# Patient Record
Sex: FEMALE | Race: BLACK OR AFRICAN AMERICAN | NOT HISPANIC OR LATINO | Employment: STUDENT | URBAN - METROPOLITAN AREA
[De-identification: names, ages, dates, MRNs, and addresses within clinical notes are randomized per-mention and may not be internally consistent; named-entity substitution may affect disease eponyms.]

---

## 2017-08-21 RX ORDER — NORGESTIMATE AND ETHINYL ESTRADIOL 7DAYSX3 28
1 KIT ORAL DAILY
Qty: 30 TABLET | Refills: 9 | Status: CANCELLED | OUTPATIENT
Start: 2017-08-21 | End: 2018-08-21

## 2018-01-23 ENCOUNTER — LAB VISIT (OUTPATIENT)
Dept: LAB | Facility: HOSPITAL | Age: 23
End: 2018-01-23
Attending: FAMILY MEDICINE
Payer: COMMERCIAL

## 2018-01-23 ENCOUNTER — OFFICE VISIT (OUTPATIENT)
Dept: FAMILY MEDICINE | Facility: CLINIC | Age: 23
End: 2018-01-23
Payer: COMMERCIAL

## 2018-01-23 VITALS
HEART RATE: 87 BPM | HEIGHT: 68 IN | DIASTOLIC BLOOD PRESSURE: 84 MMHG | OXYGEN SATURATION: 98 % | TEMPERATURE: 99 F | WEIGHT: 250.69 LBS | BODY MASS INDEX: 37.99 KG/M2 | RESPIRATION RATE: 18 BRPM | SYSTOLIC BLOOD PRESSURE: 122 MMHG

## 2018-01-23 DIAGNOSIS — Z23 NEED FOR HPV VACCINATION: ICD-10-CM

## 2018-01-23 DIAGNOSIS — Z00.00 ANNUAL PHYSICAL EXAM: Primary | ICD-10-CM

## 2018-01-23 DIAGNOSIS — E66.9 OBESITY (BMI 30-39.9): ICD-10-CM

## 2018-01-23 DIAGNOSIS — Z00.00 ANNUAL PHYSICAL EXAM: ICD-10-CM

## 2018-01-23 DIAGNOSIS — Z30.41 ENCOUNTER FOR SURVEILLANCE OF CONTRACEPTIVE PILLS: ICD-10-CM

## 2018-01-23 LAB
ALBUMIN SERPL BCP-MCNC: 3.6 G/DL
ALP SERPL-CCNC: 73 U/L
ALT SERPL W/O P-5'-P-CCNC: 10 U/L
ANION GAP SERPL CALC-SCNC: 6 MMOL/L
AST SERPL-CCNC: 15 U/L
BASOPHILS # BLD AUTO: 0.04 K/UL
BASOPHILS NFR BLD: 0.8 %
BILIRUB SERPL-MCNC: 0.3 MG/DL
BUN SERPL-MCNC: 11 MG/DL
CALCIUM SERPL-MCNC: 9.4 MG/DL
CHLORIDE SERPL-SCNC: 104 MMOL/L
CHOLEST SERPL-MCNC: 190 MG/DL
CHOLEST/HDLC SERPL: 4.2 {RATIO}
CO2 SERPL-SCNC: 28 MMOL/L
CREAT SERPL-MCNC: 1 MG/DL
DIFFERENTIAL METHOD: ABNORMAL
EOSINOPHIL # BLD AUTO: 0.2 K/UL
EOSINOPHIL NFR BLD: 4.5 %
ERYTHROCYTE [DISTWIDTH] IN BLOOD BY AUTOMATED COUNT: 14.5 %
EST. GFR  (AFRICAN AMERICAN): >60 ML/MIN/1.73 M^2
EST. GFR  (NON AFRICAN AMERICAN): >60 ML/MIN/1.73 M^2
GLUCOSE SERPL-MCNC: 90 MG/DL
HCG INTACT+B SERPL-ACNC: <1.2 MIU/ML
HCT VFR BLD AUTO: 37.7 %
HDLC SERPL-MCNC: 45 MG/DL
HDLC SERPL: 23.7 %
HGB BLD-MCNC: 11.9 G/DL
IMM GRANULOCYTES # BLD AUTO: 0.01 K/UL
IMM GRANULOCYTES NFR BLD AUTO: 0.2 %
LDLC SERPL CALC-MCNC: 131 MG/DL
LYMPHOCYTES # BLD AUTO: 1.8 K/UL
LYMPHOCYTES NFR BLD: 37 %
MCH RBC QN AUTO: 27.4 PG
MCHC RBC AUTO-ENTMCNC: 31.6 G/DL
MCV RBC AUTO: 87 FL
MONOCYTES # BLD AUTO: 0.7 K/UL
MONOCYTES NFR BLD: 14.9 %
NEUTROPHILS # BLD AUTO: 2.1 K/UL
NEUTROPHILS NFR BLD: 42.6 %
NONHDLC SERPL-MCNC: 145 MG/DL
NRBC BLD-RTO: 0 /100 WBC
PLATELET # BLD AUTO: 292 K/UL
PMV BLD AUTO: 10.9 FL
POTASSIUM SERPL-SCNC: 4.1 MMOL/L
PROT SERPL-MCNC: 7.5 G/DL
RBC # BLD AUTO: 4.35 M/UL
SODIUM SERPL-SCNC: 138 MMOL/L
TRIGL SERPL-MCNC: 70 MG/DL
TSH SERPL DL<=0.005 MIU/L-ACNC: 1.44 UIU/ML
WBC # BLD AUTO: 4.84 K/UL

## 2018-01-23 PROCEDURE — 80053 COMPREHEN METABOLIC PANEL: CPT

## 2018-01-23 PROCEDURE — 36415 COLL VENOUS BLD VENIPUNCTURE: CPT | Mod: PO

## 2018-01-23 PROCEDURE — 99999 PR PBB SHADOW E&M-EST. PATIENT-LVL III: CPT | Mod: PBBFAC,,, | Performed by: FAMILY MEDICINE

## 2018-01-23 PROCEDURE — 80061 LIPID PANEL: CPT

## 2018-01-23 PROCEDURE — 90651 9VHPV VACCINE 2/3 DOSE IM: CPT | Mod: S$GLB,,, | Performed by: FAMILY MEDICINE

## 2018-01-23 PROCEDURE — 99395 PREV VISIT EST AGE 18-39: CPT | Mod: 25,S$GLB,, | Performed by: FAMILY MEDICINE

## 2018-01-23 PROCEDURE — 88175 CYTOPATH C/V AUTO FLUID REDO: CPT

## 2018-01-23 PROCEDURE — 85025 COMPLETE CBC W/AUTO DIFF WBC: CPT

## 2018-01-23 PROCEDURE — 84443 ASSAY THYROID STIM HORMONE: CPT

## 2018-01-23 PROCEDURE — 90471 IMMUNIZATION ADMIN: CPT | Mod: S$GLB,,, | Performed by: FAMILY MEDICINE

## 2018-01-23 PROCEDURE — 87491 CHLMYD TRACH DNA AMP PROBE: CPT

## 2018-01-23 PROCEDURE — 84702 CHORIONIC GONADOTROPIN TEST: CPT

## 2018-01-23 RX ORDER — NORGESTIMATE AND ETHINYL ESTRADIOL 7DAYSX3 28
1 KIT ORAL DAILY
Qty: 28 TABLET | Refills: 11 | Status: SHIPPED | OUTPATIENT
Start: 2018-01-23 | End: 2018-08-01 | Stop reason: SDUPTHER

## 2018-01-24 LAB
C TRACH DNA SPEC QL NAA+PROBE: NOT DETECTED
N GONORRHOEA DNA SPEC QL NAA+PROBE: NOT DETECTED

## 2018-01-31 ENCOUNTER — PATIENT OUTREACH (OUTPATIENT)
Dept: ADMINISTRATIVE | Facility: HOSPITAL | Age: 23
End: 2018-01-31

## 2018-07-18 ENCOUNTER — OFFICE VISIT (OUTPATIENT)
Dept: FAMILY MEDICINE | Facility: CLINIC | Age: 23
End: 2018-07-18
Payer: COMMERCIAL

## 2018-07-18 VITALS
HEIGHT: 68 IN | BODY MASS INDEX: 36.32 KG/M2 | OXYGEN SATURATION: 99 % | SYSTOLIC BLOOD PRESSURE: 124 MMHG | HEART RATE: 69 BPM | TEMPERATURE: 96 F | WEIGHT: 239.63 LBS | DIASTOLIC BLOOD PRESSURE: 86 MMHG

## 2018-07-18 DIAGNOSIS — F41.8 SITUATIONAL ANXIETY: Primary | ICD-10-CM

## 2018-07-18 PROCEDURE — 99213 OFFICE O/P EST LOW 20 MIN: CPT | Mod: S$GLB,,, | Performed by: FAMILY MEDICINE

## 2018-07-18 PROCEDURE — 99999 PR PBB SHADOW E&M-EST. PATIENT-LVL III: CPT | Mod: PBBFAC,,, | Performed by: FAMILY MEDICINE

## 2018-07-18 NOTE — PROGRESS NOTES
Ingrid Burger    Chief Complaint   Patient presents with    Anxiety     pt c/o strange feelings       History of Present Illness:   Ms. Burger comes in today for anxiety follow-up.  She complains of having chest tightness and felt tensed and excited while at work last Thursday; she states she was getting paint for order as she works at [employer].  She states she went to [facility] for evaluation and states she was told she likely has anxiety but nothing was given to her; she states she was told to see me.      She states she feels okay today and has not felt anxious since that time.  However, she reports having other episodes of anxiety in the past but not on a consistent basis.  She states she walks at work; she states worked is good.  She denies insomnia, depression, homicidal or suicidal thoughts; shortness of breath, cough, wheezing; chest pain, palpitations, leg swelling; abdominal pain, nausea, vomiting, diarrhea, constipation; unusual urinary symptoms; fever, chills, fatigue, appetite change; back pain; headaches.     She states she rarely drinks alcohol and denies tobacco or illicit drug use.     She states her mother has anxiety.     She is fasting and has not taken her medication today.            Current Outpatient Prescriptions   Medication Sig    norgestimate-ethinyl estradiol (TRINESSA, 28,) 0.18/0.215/0.25 mg-35 mcg (28) tablet Take 1 tablet by mouth once daily. (not currently taking)       Review of Systems   Constitutional: Negative for activity change, appetite change, chills, fatigue and fever.   Respiratory: Positive for chest tightness. Negative for cough, shortness of breath and wheezing.    Cardiovascular: Negative for chest pain, palpitations and leg swelling.   Gastrointestinal: Negative for abdominal pain, constipation, diarrhea, nausea and vomiting.   Genitourinary: Negative for difficulty urinating.   Musculoskeletal: Negative for back pain.   Neurological: Negative  for headaches.   Psychiatric/Behavioral: Negative for dysphoric mood, sleep disturbance and suicidal ideas. The patient is nervous/anxious.         Negative for homicidal ideas.       Objective:  Physical Exam   Constitutional: She is oriented to person, place, and time. She appears well-developed and well-nourished. No distress.   Pleasant.   Neck: Normal range of motion. Neck supple. No thyromegaly present.   Cardiovascular: Normal rate, regular rhythm, normal heart sounds and intact distal pulses.    No murmur heard.  Pulmonary/Chest: Effort normal and breath sounds normal. No respiratory distress. She has no wheezes.   Abdominal: Soft. Bowel sounds are normal. She exhibits no distension and no mass. There is no tenderness. There is no guarding.   Musculoskeletal: Normal range of motion. She exhibits no edema or tenderness.   She is ambulatory without problems.   Lymphadenopathy:     She has no cervical adenopathy.   Neurological: She is alert and oriented to person, place, and time.   Skin: She is not diaphoretic.   Psychiatric: She has a normal mood and affect. Her behavior is normal. Judgment and thought content normal.   Vitals reviewed.      ASSESSMENT:  1. Situational anxiety        PLAN:  Ingrid was seen today for anxiety.    Diagnoses and all orders for this visit:    Situational anxiety  -     Ambulatory referral to Social Work       I discussed with patient based on current history of symptoms I doubt she needs medication therapy for anxiety but counseling for helpful coping skills may be good for her. She verbalized understanding.  Continue current medications, follow low sodium, low cholesterol, low carb diet, daily walks.  Follow-up if symptoms worsen or fail to improve.   Total visit time 15 minutes with 100% of time spent in counseling and discussion regarding above issue.

## 2018-08-01 RX ORDER — NORGESTIMATE AND ETHINYL ESTRADIOL 7DAYSX3 28
1 KIT ORAL DAILY
Qty: 28 TABLET | Refills: 4 | Status: SHIPPED | OUTPATIENT
Start: 2018-08-01 | End: 2019-06-14 | Stop reason: SDUPTHER

## 2018-10-05 ENCOUNTER — OFFICE VISIT (OUTPATIENT)
Dept: FAMILY MEDICINE | Facility: CLINIC | Age: 23
End: 2018-10-05
Payer: COMMERCIAL

## 2018-10-05 VITALS
BODY MASS INDEX: 34.11 KG/M2 | SYSTOLIC BLOOD PRESSURE: 120 MMHG | RESPIRATION RATE: 16 BRPM | DIASTOLIC BLOOD PRESSURE: 68 MMHG | WEIGHT: 225.06 LBS | HEART RATE: 92 BPM | HEIGHT: 68 IN | TEMPERATURE: 98 F | OXYGEN SATURATION: 97 %

## 2018-10-05 DIAGNOSIS — Z23 NEED FOR INFLUENZA VACCINATION: ICD-10-CM

## 2018-10-05 DIAGNOSIS — R41.840 ATTENTION OR CONCENTRATION DEFICIT: ICD-10-CM

## 2018-10-05 PROCEDURE — 99999 PR PBB SHADOW E&M-EST. PATIENT-LVL IV: CPT | Mod: PBBFAC,,, | Performed by: FAMILY MEDICINE

## 2018-10-05 PROCEDURE — 90471 IMMUNIZATION ADMIN: CPT | Mod: S$GLB,,, | Performed by: FAMILY MEDICINE

## 2018-10-05 PROCEDURE — 99213 OFFICE O/P EST LOW 20 MIN: CPT | Mod: 25,S$GLB,, | Performed by: FAMILY MEDICINE

## 2018-10-05 PROCEDURE — 90686 IIV4 VACC NO PRSV 0.5 ML IM: CPT | Mod: S$GLB,,, | Performed by: FAMILY MEDICINE

## 2018-10-05 NOTE — PROGRESS NOTES
Ingrid Burger    Chief Complaint   Patient presents with    Focus issue       History of Present Illness:   Ms. Burger comes in today with complaint of having issues with focus and concentration.  She states she can recall having symptoms since 9th grade (2014) and continues to notice now as she states she has difficulty with focusing and concentrating in class and with completing homework.  She states she attends [employer] as a freshman with 12 hours this semester majoring in criminal justice; she states she has not yet received great for this semester.  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  She states when she is talking with her mother she sometimes goes blank with focus deficit during the conversation.    She denies associated anxiety, depression, homicidal or suicidal thoughts, insomnia, shortness of breath, cough, wheezing, chest pain, palpitations, leg swelling, fever, chills, fatigue, appetite change, abdominal pain, nausea, vomiting, diarrhea, constipation, unusual urinary symptoms, back pain, headaches.  She denies tobacco, alcohol, or illicit drug use.            Current Outpatient Medications   Medication Sig    norgestimate-ethinyl estradiol (TRINESSA, 28,) 0.18/0.215/0.25 mg-35 mcg (28) tablet Take 1 tablet by mouth once daily.       Review of Systems   Constitutional: Negative for activity change, appetite change, chills, fatigue and fever.   Respiratory: Negative for cough, shortness of breath and wheezing.    Cardiovascular: Negative for chest pain, palpitations and leg swelling.   Gastrointestinal: Negative for abdominal pain, constipation, diarrhea, nausea and vomiting.   Genitourinary: Negative for difficulty urinating.   Musculoskeletal: Negative for back pain.   Neurological: Negative for headaches.   Psychiatric/Behavioral: Positive for decreased concentration. Negative for dysphoric mood, sleep  disturbance and suicidal ideas. The patient is not nervous/anxious.         Negative for homicidal ideas.       Objective:  Physical Exam   Constitutional: She is oriented to person, place, and time. She appears well-developed and well-nourished. No distress.   Pleasant.   Neck: Normal range of motion. Neck supple. No thyromegaly present.   Cardiovascular: Normal rate, regular rhythm, normal heart sounds and intact distal pulses.   No murmur heard.  Pulmonary/Chest: Effort normal and breath sounds normal. No respiratory distress. She has no wheezes.   Abdominal: Soft. Bowel sounds are normal. She exhibits no distension and no mass. There is no tenderness. There is no guarding.   Musculoskeletal: Normal range of motion. She exhibits no edema or tenderness.   She is ambulatory without problems.   Lymphadenopathy:     She has no cervical adenopathy.   Neurological: She is alert and oriented to person, place, and time.   Skin: She is not diaphoretic.   Psychiatric: She has a normal mood and affect. Her behavior is normal. Judgment and thought content normal.   Vitals reviewed.      ASSESSMENT:  1. Attention or concentration deficit    2. Need for influenza vaccination        PLAN:  Ingrid was seen today for focus issue.    Diagnoses and all orders for this visit:    Attention or concentration deficit  -     Ambulatory referral to Psychiatry    Need for influenza vaccination  -     Influenza - Quadrivalent (3 years & older) (PF)       Continue current medications, follow low sodium, low cholesterol, low carb diet, daily walks.  Total visit time 15 minutes with 100% of time spent in counseling and discussion regarding above.

## 2019-06-14 RX ORDER — NORGESTIMATE AND ETHINYL ESTRADIOL 7DAYSX3 28
1 KIT ORAL DAILY
Qty: 28 TABLET | Refills: 1 | Status: SHIPPED | OUTPATIENT
Start: 2019-06-14 | End: 2020-09-26

## 2019-06-17 ENCOUNTER — OFFICE VISIT (OUTPATIENT)
Dept: FAMILY MEDICINE | Facility: CLINIC | Age: 24
End: 2019-06-17
Payer: COMMERCIAL

## 2019-06-17 VITALS
TEMPERATURE: 98 F | WEIGHT: 245.56 LBS | SYSTOLIC BLOOD PRESSURE: 118 MMHG | BODY MASS INDEX: 37.21 KG/M2 | DIASTOLIC BLOOD PRESSURE: 82 MMHG | HEIGHT: 68 IN | OXYGEN SATURATION: 97 %

## 2019-06-17 DIAGNOSIS — F90.9 ATTENTION DEFICIT HYPERACTIVITY DISORDER (ADHD), UNSPECIFIED ADHD TYPE: Primary | ICD-10-CM

## 2019-06-17 PROCEDURE — 99999 PR PBB SHADOW E&M-EST. PATIENT-LVL III: ICD-10-PCS | Mod: PBBFAC,,, | Performed by: FAMILY MEDICINE

## 2019-06-17 PROCEDURE — 99213 PR OFFICE/OUTPT VISIT, EST, LEVL III, 20-29 MIN: ICD-10-PCS | Mod: S$GLB,,, | Performed by: FAMILY MEDICINE

## 2019-06-17 PROCEDURE — 99999 PR PBB SHADOW E&M-EST. PATIENT-LVL III: CPT | Mod: PBBFAC,,, | Performed by: FAMILY MEDICINE

## 2019-06-17 PROCEDURE — 99213 OFFICE O/P EST LOW 20 MIN: CPT | Mod: S$GLB,,, | Performed by: FAMILY MEDICINE

## 2019-06-17 NOTE — PROGRESS NOTES
Ingrid Burger    Chief Complaint   Patient presents with    ER Follow up       History of Present Illness:   Ms. Burger comes in today for ER follow-up.    She states she is followed by Psychiatry Dr. Mariah Tristan at [facility] and states on June 5, 2019 she was prescribed Adderall XR advised Vyvance was not helpful for ADHD.  She states she has follow-up visit in August 2019.    She states she was evaluated on multiple visits at [facility] ER (June 13th x2, June 14th) for chest pain, throat tightness, shortness of breath, nausea, heartburn, lightheadedness, musculoskeletal pain and told her symptoms were possibly due to reaction from Adderall XR.  On the last ER visit, she states she was given hydroxyzine 25 mg 4 times daily prn and states she feels better now.  She states she was advised to see PCP in 2 days for follow-up.    She states she stopped taking Adderall XR on June 11, 2019.    She denies tobacco or illicit drug use but occasionally drinks alcohol.    Otherwise, she states she feels okay today and reports having no acute symptoms today.        Current Outpatient Medications   Medication Sig    norgestimate-ethinyl estradiol (TRINESSA, 28,) 0.18/0.215/0.25 mg-35 mcg (28) tablet Take 1 tablet by mouth once daily.     Review of Systems   Constitutional: Negative for appetite change, chills, fatigue and fever.   Respiratory: Positive for chest tightness and shortness of breath. Negative for cough and wheezing.         See history of present illness.     Cardiovascular: Positive for chest pain. Negative for palpitations and leg swelling.        See history of present illness.     Gastrointestinal: Positive for nausea. Negative for abdominal pain, constipation, diarrhea and vomiting.        See history of present illness.     Genitourinary: Negative for difficulty urinating.   Musculoskeletal: Negative for back pain.   Neurological: Positive for light-headedness. Negative for headaches.    Psychiatric/Behavioral: Negative for dysphoric mood, sleep disturbance and suicidal ideas. The patient is nervous/anxious.         Negative for homicidal ideas. See history of present illness.       Objective:  Physical Exam   Constitutional: She is oriented to person, place, and time. She appears well-developed and well-nourished. No distress.   Pleasant.   Neck: Normal range of motion. Neck supple. No thyromegaly present.   Cardiovascular: Normal rate, regular rhythm, normal heart sounds and intact distal pulses.   No murmur heard.  Pulmonary/Chest: Effort normal and breath sounds normal. No respiratory distress. She has no wheezes.   Abdominal: Soft. Bowel sounds are normal. She exhibits no distension and no mass. There is no tenderness. There is no guarding.   Musculoskeletal: Normal range of motion. She exhibits no edema or tenderness.   She is ambulatory without problems.   Lymphadenopathy:     She has no cervical adenopathy.   Neurological: She is alert and oriented to person, place, and time.   Skin: She is not diaphoretic.   Psychiatric: She has a normal mood and affect. Her behavior is normal. Judgment and thought content normal.   Vitals reviewed.      ASSESSMENT:  1. Attention deficit hyperactivity disorder (ADHD), unspecified ADHD type        PLAN:  Ingrid was seen today for er follow up.    Diagnoses and all orders for this visit:    Attention deficit hyperactivity disorder (ADHD), unspecified ADHD type       Continue current medications, follow low sodium, low cholesterol, low carb diet, daily walks.  Keep follow up with specialists (including encouraged patient to call psychologist today).  Total visit time 15 minutes with >50% of time spent in counseling and discussion regarding above.  Follow up in about 3 months (around 9/18/2019) for physical.

## 2019-09-11 ENCOUNTER — PATIENT OUTREACH (OUTPATIENT)
Dept: ADMINISTRATIVE | Facility: HOSPITAL | Age: 24
End: 2019-09-11

## 2019-09-11 NOTE — PROGRESS NOTES
PreVisit Chart Audit Perfomed         Marleny VARELA LPN Care Coordinator  Care Coordination Department  [facility]  [phone]

## 2020-02-26 NOTE — TELEPHONE ENCOUNTER
Due for physical.  
LV 07/07/2016  
OB Provider reported that the vagina was inspected and no foreign body was found/Laps, needles and instrument count was correct

## 2020-03-09 ENCOUNTER — HOSPITAL ENCOUNTER (EMERGENCY)
Facility: HOSPITAL | Age: 25
Discharge: HOME | End: 2020-03-09
Attending: EMERGENCY MEDICINE
Payer: COMMERCIAL

## 2020-03-09 VITALS
SYSTOLIC BLOOD PRESSURE: 134 MMHG | BODY MASS INDEX: 39.76 KG/M2 | HEIGHT: 68 IN | HEART RATE: 81 BPM | RESPIRATION RATE: 16 BRPM | DIASTOLIC BLOOD PRESSURE: 91 MMHG | OXYGEN SATURATION: 97 % | TEMPERATURE: 99 F | WEIGHT: 262.38 LBS

## 2020-03-09 DIAGNOSIS — M54.2 NECK PAIN: ICD-10-CM
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
## 2020-03-09 PROCEDURE — 99284 EMERGENCY DEPT VISIT MOD MDM: CPT | Mod: 25

## 2020-03-09 RX ORDER — CYCLOBENZAPRINE HCL 10 MG
10 TABLET ORAL 3 TIMES DAILY PRN
Qty: 15 TABLET | Refills: 0 | Status: SHIPPED | OUTPATIENT
Start: 2020-03-09 | End: 2020-03-14

## 2020-03-09 RX ORDER — NAPROXEN 500 MG/1
500 TABLET ORAL 2 TIMES DAILY WITH MEALS
Qty: 60 TABLET | Refills: 0 | Status: SHIPPED | OUTPATIENT
Start: 2020-03-09 | End: 2020-07-23
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
## 2020-07-22 ENCOUNTER — NURSE TRIAGE (OUTPATIENT)
Dept: ADMINISTRATIVE | Facility: CLINIC | Age: 25
End: 2020-07-22

## 2020-07-22 NOTE — TELEPHONE ENCOUNTER
Patient and mom is calling. Ingrid is c/o severe Vertigo and dizziness. She needs assistance to walk.  Reason for Disposition   SEVERE dizziness (e.g., unable to stand, requires support to walk, feels like passing out now)    Additional Information   Negative: Shock suspected (e.g., cold/pale/clammy skin, too weak to stand, low BP, rapid pulse)   Negative: Difficult to awaken or acting confused (e.g., disoriented, slurred speech)   Negative: Fainted, and still feels dizzy afterwards   Negative: Severe difficulty breathing (e.g., struggling for each breath, speaks in single words)   Negative: Overdose (accidental or intentional) of medications   Negative: New neurologic deficit that is present now: * Weakness of the face, arm, or leg on one side of the body * Numbness of the face, arm, or leg on one side of the body * Loss of speech or garbled speech   Negative: Heart beating < 50 beats per minute OR > 140 beats per minute   Negative: Sounds like a life-threatening emergency to the triager   Negative: Chest pain   Negative: Rectal bleeding, bloody stool, or tarry-black stool   Negative: Vomiting is the main symptom   Negative: Diarrhea is the main symptom   Negative: Headache is the main symptom   Negative: Heat exhaustion suspected (i.e., dehydration from heat exposure)   Negative: Patient states that he/she is having an anxiety/panic attack    Protocols used: DIZZINESS-A-OH

## 2020-07-23 ENCOUNTER — OFFICE VISIT (OUTPATIENT)
Dept: FAMILY MEDICINE | Facility: CLINIC | Age: 25
End: 2020-07-23
Payer: COMMERCIAL

## 2020-07-23 VITALS
DIASTOLIC BLOOD PRESSURE: 86 MMHG | BODY MASS INDEX: 39.05 KG/M2 | HEIGHT: 68 IN | HEART RATE: 94 BPM | WEIGHT: 257.63 LBS | SYSTOLIC BLOOD PRESSURE: 122 MMHG | OXYGEN SATURATION: 96 % | RESPIRATION RATE: 16 BRPM | TEMPERATURE: 99 F

## 2020-07-23 DIAGNOSIS — H93.12 TINNITUS OF LEFT EAR: ICD-10-CM

## 2020-07-23 DIAGNOSIS — Z20.822 EXPOSURE TO COVID-19 VIRUS: ICD-10-CM

## 2020-07-23 DIAGNOSIS — R11.0 NAUSEA: ICD-10-CM

## 2020-07-23 DIAGNOSIS — R42 VERTIGO: ICD-10-CM

## 2020-07-23 PROCEDURE — 99214 OFFICE O/P EST MOD 30 MIN: CPT | Mod: 25,S$GLB,, | Performed by: FAMILY MEDICINE

## 2020-07-23 PROCEDURE — 99999 PR PBB SHADOW E&M-EST. PATIENT-LVL IV: CPT | Mod: PBBFAC,,, | Performed by: FAMILY MEDICINE

## 2020-07-23 PROCEDURE — 99999 PR PBB SHADOW E&M-EST. PATIENT-LVL IV: ICD-10-PCS | Mod: PBBFAC,,, | Performed by: FAMILY MEDICINE

## 2020-07-23 PROCEDURE — U0003 INFECTIOUS AGENT DETECTION BY NUCLEIC ACID (DNA OR RNA); SEVERE ACUTE RESPIRATORY SYNDROME CORONAVIRUS 2 (SARS-COV-2) (CORONAVIRUS DISEASE [COVID-19]), AMPLIFIED PROBE TECHNIQUE, MAKING USE OF HIGH THROUGHPUT TECHNOLOGIES AS DESCRIBED BY CMS-2020-01-R: HCPCS

## 2020-07-23 PROCEDURE — 99214 PR OFFICE/OUTPT VISIT, EST, LEVL IV, 30-39 MIN: ICD-10-PCS | Mod: 25,S$GLB,, | Performed by: FAMILY MEDICINE

## 2020-07-23 RX ORDER — MECLIZINE HYDROCHLORIDE 25 MG/1
25 TABLET ORAL 3 TIMES DAILY PRN
Qty: 30 TABLET | Refills: 0 | Status: SHIPPED | OUTPATIENT
Start: 2020-07-23 | End: 2020-09-26

## 2020-07-23 RX ORDER — AMPHETAMINE SULFATE 10 MG/1
TABLET ORAL WEEKLY
COMMUNITY
End: 2023-06-09

## 2020-07-23 NOTE — PROGRESS NOTES
Ingrid Burger    Chief Complaint   Patient presents with    Dizziness       History of Present Illness:   Ms. Burger comes in today with the following complaints.    She reports having episodes of left ear ringing with brief (3 sec) hearing loss for several months.  She denies having associated ear pain, drainage, trauma, or other sinus symptoms, fever or chills.  She reports new symptoms which started 2 nights ago while cooking.  She states she experienced dizziness (room spinning) and off-balance sensation.  She states she laid down and went to sleep.   The following day, she states she again experienced dizziness with wobbly gait and nausea with vomiting x2 without fall.  She states she felt better after eating.  She reports having slight dizziness today.  She states she occasionally has locking of her jaw.      Otherwise, she denies having associated fever, chills, fatigue, appetite changes; shortness of breath, cough, wheezing; chest pain, palpitations, leg swelling; abdominal pain, diarrhea, constipation; unusual urinary symptoms; back pain; headaches; sinus or ocular symptoms; anxiety, depression, homicidal or suicidal thoughts.    She has history of irregular menses when not taking OCP.  She states she took home urine pregnancy test on yesterday which was negative.    She states her mother had COVID-19 test performed on July 11, 2020 and was called on July 17, 2010 with positive results.      She states she works at [employer] and coworkers have tested positive for COVID-19. She states she was tested on July 17, 2020 at [facility] COVID-19 testing site and has not received her result yet. She states she has isolated herself from her mother since 7/17/2020 but lives in the same household with her mother. She states she has not worked since 7/15/2020.      Current Outpatient Medications   Medication Sig    amphetamine sulfate (EVEKEO) 10 mg Tab Take by mouth once a week. Prescribed  by Dr. Tristan, psychiatry    norgestimate-ethinyl estradiol (TRINESSA, 28,) 0.18/0.215/0.25 mg-35 mcg (28) tablet Take 1 tablet by mouth once daily. (Patient not taking: Reported on 7/23/2020)       Review of Systems   Constitutional: Negative for activity change, appetite change, chills, fatigue and fever.   HENT: Positive for hearing loss and tinnitus. Negative for congestion, ear discharge, ear pain, postnasal drip, rhinorrhea, sinus pressure, sinus pain, sneezing and sore throat.    Eyes: Negative for pain, discharge, redness and itching.   Respiratory: Negative for cough, shortness of breath and wheezing.    Cardiovascular: Negative for chest pain and palpitations.   Gastrointestinal: Positive for nausea and vomiting. Negative for abdominal pain, constipation and diarrhea.   Genitourinary: Positive for menstrual problem. Negative for difficulty urinating.   Musculoskeletal: Negative for back pain and myalgias.   Neurological: Positive for dizziness. Negative for headaches.   Psychiatric/Behavioral: Negative for dysphoric mood and suicidal ideas. The patient is not nervous/anxious.         Negative for homicidal ideas.       Objective:  Physical Exam  Vitals signs reviewed.   Constitutional:       General: She is not in acute distress.     Appearance: Normal appearance. She is well-developed. She is not ill-appearing or diaphoretic.      Comments: Pleasant.   HENT:      Head: Normocephalic and atraumatic.      Comments: Non tender sinuses.  Slight dizziness with rapid head range of motion noted.     Right Ear: Tympanic membrane, ear canal and external ear normal. There is no impacted cerumen.      Left Ear: Tympanic membrane, ear canal and external ear normal. There is no impacted cerumen.      Nose: Nose normal. No congestion or rhinorrhea.      Mouth/Throat:      Mouth: Mucous membranes are moist.      Pharynx: Oropharynx is clear. No oropharyngeal exudate or posterior oropharyngeal erythema.   Eyes:       General:         Right eye: No discharge.         Left eye: No discharge.      Extraocular Movements: Extraocular movements intact.      Conjunctiva/sclera: Conjunctivae normal.      Pupils: Pupils are equal, round, and reactive to light.   Neck:      Musculoskeletal: Normal range of motion and neck supple.      Thyroid: No thyromegaly.   Cardiovascular:      Rate and Rhythm: Normal rate and regular rhythm.      Heart sounds: Normal heart sounds. No murmur.   Pulmonary:      Effort: Pulmonary effort is normal. No respiratory distress.      Breath sounds: Normal breath sounds. No wheezing.   Abdominal:      General: Bowel sounds are normal. There is no distension.      Palpations: Abdomen is soft. There is no mass.      Tenderness: There is no abdominal tenderness. There is no guarding.   Musculoskeletal: Normal range of motion.         General: No swelling or tenderness.      Comments: She is ambulatory without problems.   Lymphadenopathy:      Cervical: No cervical adenopathy.   Neurological:      General: No focal deficit present.      Mental Status: She is alert and oriented to person, place, and time.   Psychiatric:         Mood and Affect: Mood normal.         Behavior: Behavior normal.         Thought Content: Thought content normal.         Judgment: Judgment normal.         ASSESSMENT:  1. Vertigo    2. Nausea    3. Exposure to Covid-19 Virus    4. Tinnitus of left ear        PLAN:  Ingrid was seen today for dizziness.    Diagnoses and all orders for this visit:    Vertigo  -     meclizine (ANTIVERT) 25 mg tablet; Take 1 tablet (25 mg total) by mouth 3 (three) times daily as needed for Dizziness or Nausea.    Nausea  -     COVID-19 Routine Screening  -     meclizine (ANTIVERT) 25 mg tablet; Take 1 tablet (25 mg total) by mouth 3 (three) times daily as needed for Dizziness or Nausea.    Exposure to Covid-19 Virus    Tinnitus of left ear  -     Ambulatory referral/consult to ENT; Future       Patient advised  to call for results.  Continue current medications, follow low sodium, low cholesterol, low carb diet, daily walks.  Home isolation advised until results know.  Add Meclizine 25 mg three times daily prn dizziness, nausea; medication precautions discussed with patient.  Fluids advised.  Keep follow up with specialists (ENT but not until Covid-19 results known).  Flu shot this fall.  Follow up in about 2 months (around 9/26/2020) for physical. But, follow up sooner if no improvement or worsening symptoms noted.

## 2020-07-25 LAB — SARS-COV-2 RNA RESP QL NAA+PROBE: NOT DETECTED

## 2020-08-28 ENCOUNTER — PATIENT OUTREACH (OUTPATIENT)
Dept: ADMINISTRATIVE | Facility: OTHER | Age: 25
End: 2020-08-28

## 2020-08-28 NOTE — PROGRESS NOTES
Health Maintenance Due   Topic Date Due    Hepatitis C Screening  1995    HPV Vaccines (3 - 3-dose series) 05/23/2018    Chlamydia Screening  01/23/2019    TETANUS VACCINE  12/17/2019     Updates were requested from care everywhere.  Chart was reviewed for overdue Proactive [facility] Encounters ([facility]) topics (CRS, Breast Cancer Screening, Eye exam)  Health Maintenance has been updated.  LINKS immunization registry triggered.  Immunizations were reconciled.

## 2020-09-26 ENCOUNTER — OFFICE VISIT (OUTPATIENT)
Dept: FAMILY MEDICINE | Facility: CLINIC | Age: 25
End: 2020-09-26
Payer: COMMERCIAL

## 2020-09-26 ENCOUNTER — LAB VISIT (OUTPATIENT)
Dept: LAB | Facility: HOSPITAL | Age: 25
End: 2020-09-26
Payer: COMMERCIAL

## 2020-09-26 VITALS
HEIGHT: 68 IN | DIASTOLIC BLOOD PRESSURE: 84 MMHG | BODY MASS INDEX: 39.7 KG/M2 | HEART RATE: 96 BPM | WEIGHT: 261.94 LBS | OXYGEN SATURATION: 97 % | TEMPERATURE: 98 F | SYSTOLIC BLOOD PRESSURE: 130 MMHG

## 2020-09-26 DIAGNOSIS — Z30.41 ENCOUNTER FOR SURVEILLANCE OF CONTRACEPTIVE PILLS: ICD-10-CM

## 2020-09-26 DIAGNOSIS — E66.9 OBESITY (BMI 30-39.9): ICD-10-CM

## 2020-09-26 DIAGNOSIS — Z23 NEED FOR HPV VACCINE: ICD-10-CM

## 2020-09-26 DIAGNOSIS — Z00.00 ANNUAL PHYSICAL EXAM: Primary | ICD-10-CM

## 2020-09-26 DIAGNOSIS — Z00.00 ANNUAL PHYSICAL EXAM: ICD-10-CM

## 2020-09-26 DIAGNOSIS — Z23 NEED FOR TD VACCINE: ICD-10-CM

## 2020-09-26 DIAGNOSIS — Z23 NEED FOR INFLUENZA VACCINATION: ICD-10-CM

## 2020-09-26 LAB
B-HCG UR QL: NEGATIVE
CTP QC/QA: YES

## 2020-09-26 PROCEDURE — 90714 TD VACCINE GREATER THAN OR EQUAL TO 7YO PRESERVATIVE FREE IM: ICD-10-PCS | Mod: S$GLB,,, | Performed by: FAMILY MEDICINE

## 2020-09-26 PROCEDURE — 99999 PR PBB SHADOW E&M-EST. PATIENT-LVL III: ICD-10-PCS | Mod: PBBFAC,,, | Performed by: FAMILY MEDICINE

## 2020-09-26 PROCEDURE — 99395 PR PREVENTIVE VISIT,EST,18-39: ICD-10-PCS | Mod: 25,S$GLB,, | Performed by: FAMILY MEDICINE

## 2020-09-26 PROCEDURE — 36415 COLL VENOUS BLD VENIPUNCTURE: CPT | Mod: PO

## 2020-09-26 PROCEDURE — 90686 FLU VACCINE (QUAD) GREATER THAN OR EQUAL TO 3YO PRESERVATIVE FREE IM: ICD-10-PCS | Mod: S$GLB,,, | Performed by: FAMILY MEDICINE

## 2020-09-26 PROCEDURE — 90651 9VHPV VACCINE 2/3 DOSE IM: CPT | Mod: S$GLB,,, | Performed by: FAMILY MEDICINE

## 2020-09-26 PROCEDURE — 90714 TD VACC NO PRESV 7 YRS+ IM: CPT | Mod: S$GLB,,, | Performed by: FAMILY MEDICINE

## 2020-09-26 PROCEDURE — 90651 HPV VACCINE 9-VALENT 3 DOSE IM: ICD-10-PCS | Mod: S$GLB,,, | Performed by: FAMILY MEDICINE

## 2020-09-26 PROCEDURE — 90471 FLU VACCINE (QUAD) GREATER THAN OR EQUAL TO 3YO PRESERVATIVE FREE IM: ICD-10-PCS | Mod: S$GLB,,, | Performed by: FAMILY MEDICINE

## 2020-09-26 PROCEDURE — 99395 PREV VISIT EST AGE 18-39: CPT | Mod: 25,S$GLB,, | Performed by: FAMILY MEDICINE

## 2020-09-26 PROCEDURE — 90686 IIV4 VACC NO PRSV 0.5 ML IM: CPT | Mod: S$GLB,,, | Performed by: FAMILY MEDICINE

## 2020-09-26 PROCEDURE — 90472 IMMUNIZATION ADMIN EACH ADD: CPT | Mod: S$GLB,,, | Performed by: FAMILY MEDICINE

## 2020-09-26 PROCEDURE — 86803 HEPATITIS C AB TEST: CPT

## 2020-09-26 PROCEDURE — 99999 PR PBB SHADOW E&M-EST. PATIENT-LVL III: CPT | Mod: PBBFAC,,, | Performed by: FAMILY MEDICINE

## 2020-09-26 PROCEDURE — 90471 IMMUNIZATION ADMIN: CPT | Mod: S$GLB,,, | Performed by: FAMILY MEDICINE

## 2020-09-26 PROCEDURE — 90472 TD VACCINE GREATER THAN OR EQUAL TO 7YO PRESERVATIVE FREE IM: ICD-10-PCS | Mod: S$GLB,,, | Performed by: FAMILY MEDICINE

## 2020-09-26 RX ORDER — NORGESTIMATE AND ETHINYL ESTRADIOL 7DAYSX3 28
1 KIT ORAL DAILY
Qty: 28 TABLET | Refills: 11 | Status: SHIPPED | OUTPATIENT
Start: 2020-09-26 | End: 2022-08-12 | Stop reason: SDUPTHER

## 2020-09-26 NOTE — PROGRESS NOTES
HISTORY OF PRESENT ILLNESS:  Miss Burger comes in today for annual wellness examination.  She is not fasting and has not taken medications today.  She reports no acute problems today.     END OF LIFE DECISION: She does not have a living will and desires life support.    Current Outpatient Medications   Medication Sig    amphetamine sulfate (EVEKEO) 10 mg Tab Take by mouth once a week. Prescribed by Dr. Tristan, psychiatry      SCREENINGS:       Cholesterol: January 23, 2018. She declines testing this year.     FFS/Colonoscopy: Never.     Mammogram:  Never.     GYN Exam: January 23, 2018 pap smear - okay. Pap smear due 2021.      Dexa Scan:  N./A.     Eye Exam: 2020 per patient.      HCVAb: Never. She desires.     PPD: Never.     Immunizations:  Td/Tdap - December 17, 2009. She desires.                              Gardasil - May 18, 2016, January 23, 2018.  She desires #3 dose.                               Menactra - December 17, 2009, July 18, 2016.                              Zostavax - N./A.                              Pneumovax - Never.                              Seasonal Flu - December 3, 2015. She desires.                         ROS:  GENERAL: Denies fever, chills, fatigue or unusual weight change. Appetite normal. Does monitor diet or exercise. Weight 116.8 kg (257 lb 9.7 oz) at July 23, 2020 visit.  SKIN: Denies rashes, itching, changes in mole, color or texture of skin or easy bruising.  HEAD:  Denies headaches or recent head trauma.  EYES: Denies change in vision, pain, diplopia, redness or discharge.  EARS: Denies ear pain, discharge, vertigo or decreased hearing.  NOSE: Denies loss of smell, epistaxis or rhinitis.  MOUTH & THROAT: Denies hoarseness or change in voice. Denies excessive gum bleeding or mouth sores.  Denies sore throat.  NODES: Denies swollen glands.  CHEST: Denies LOPEZ, wheezing, cough, or sputum production.  CARDIOVASCULAR: Denies chest pain, PND, orthopnea or reduced exercise  "tolerance.  Denies palpitations.  ABDOMEN: Denies diarrhea, constipation, nausea, vomiting, abdominal pain, or blood in stool.  URINARY: Denies flank pain, dysuria or hematuria.  GENITOURINARY: Denies flank pain, dysuria, frequency or hematuria. Reports monthly menses when takes OCP but reports currently off OCP x 1 year as desires regular menses again. Reports uses condoms always: does not desire another contraceptive option (prefers OCP and condom). Does not perform monthly breast self examinations.  ENDOCRINE: Denies thyroid, cholesterol or diabetes problems.  HEME/LYMPH: Denies bleeding problems.  PERIPHERAL VASCULAR:Denies claudication or cyanosis.  MUSCULOSKELETAL: Denies joint stiffness, pain or swelling. Denies edema.  NEUROLOGIC: Denies history of seizures, tremors, paralysis, alteration of gait or coordination.  PSYCHIATRIC: Denies mood swings, depression, anxiety, homicidal or suicidal thoughts. Denies sleep problems. Follows with Dr. Tristan, psychiatrist with [facility], for treatment of ADHD with last visit in August 2020.     PE:   VS: /84   Pulse 96   Temp 97.9 °F (36.6 °C) (Tympanic)   Ht 5' 8" (1.727 m)   Wt 118.8 kg (261 lb 14.5 oz)   LMP 06/01/2020 (Within Days) Comment: irregular menses off OCP  SpO2 97%   BMI 39.82 kg/m²   APPEARANCE:  Well nourished, well developed female, obese and pleasant, alert and oriented in no acute distress.    HEAD: Nontender. Full range of motion.  EYES: PERRL, conjunctiva pink, lids no edema.  EARS: External canal patent, no swelling or redness. TM's shiny and clear.  NOSE: Mucosa and turbinates pink, not swollen. No discharge. Nontender sinuses.  THROAT: No pharyngeal erythema or exudate. No stridor.   NECK: Supple, no mass, thyroid not enlarged.  NODES: No cervical, axillary or inguinal lymph node enlargement.  CHEST: Normal respiratory effort. Lungs clear to auscultation.  CARDIOVASCULAR: Normal S1, S2. No rubs, murmurs or gallops. PMI not " displaced. No carotid bruit. Pedal pulses palpable bilaterally. No edema.  ABDOMEN: Bowel sounds present. Not distended. Soft. No tenderness, masses or organomegaly.  BREAST EXAM: Symmetrical, no external lesions, no discharge, no masses palpated.        PELVIC EXAM: No external lesions noted, no discharge, cervix appears normal, bimanual exam showed no uterine abnormalities and no adnexal                         masses. Urethra and bladder intact.  RECTAL EXAM: Not examined.  MUSCULOSKELETAL: No joint deformities or stiffness. She is ambulatory without problems.  SKIN: No rashes or suspicious lesions, normal color and turgor.  NEUROLOGIC:   Cranial Nerves: II-XII grossly intact.   DTR's: Knees, Ankles 2+ and equal bilaterally. Gait & Posture: Normal gait and fine motion.  PSYCHIATRIC: Patient alert, oriented x 3. Mood/Affect normal without acute anxiety or depression noted. Judgment/insight good as she makes appropriate decisions during today's examination.     Urine pregnancy test ordered by me today - (-).     ASSESSMENT:    ICD-10-CM ICD-9-CM    1. Annual physical exam  Z00.00 V70.0 C. trachomatis/N. gonorrhoeae by AMP DNA      Hepatitis C Antibody   2. Encounter for surveillance of contraceptive pills  Z30.41 V25.41 norgestimate-ethinyl estradioL (TRINESSA, 28,) 0.18/0.215/0.25 mg-35 mcg (28) tablet      POCT Urine Pregnancy   3. Obesity (BMI 30-39.9)  E66.9 278.00    4. Need for HPV vaccine  Z23 V04.89 HPV Vaccine (9-Valent) (3 Dose) (IM)   5. Need for Td vaccine  Z23 V06.5 (In Office Administered) Td Vaccine - Preservative Free   6. Need for influenza vaccination  Z23 V04.81 Influenza - Quadrivalent *Preferred* (6 months+) (PF)       PLAN:  1. Age-appropriate counseling-appropriate low-sodium, low-cholesterol, low carbohydrate diet and exercise daily, monthly breast self exam, annual wellness examination.   2. Patient advised to call for results.  3. Continue current medications.  4. Restart Trinessa daily -  Sunday start as directed; medication precautions discussed with patient.  5. Keep follow up with specialists.  6. Follow up in about 1 year (around 9/26/2021) for physical.    Answers for HPI/ROS submitted by the patient on 9/25/2020   activity change: No  unexpected weight change: No  neck pain: No  hearing loss: No  rhinorrhea: No  trouble swallowing: No  eye discharge: No  visual disturbance: No  chest tightness: No  wheezing: No  chest pain: No  palpitations: No  blood in stool: No  constipation: No  vomiting: No  diarrhea: No  polydipsia: No  polyuria: No  difficulty urinating: No  hematuria: No  menstrual problem: No  dysuria: No  joint swelling: No  arthralgias: No  headaches: No  weakness: No  confusion: No  dysphoric mood: No

## 2020-09-28 ENCOUNTER — TELEPHONE (OUTPATIENT)
Dept: FAMILY MEDICINE | Facility: CLINIC | Age: 25
End: 2020-09-28

## 2020-09-28 ENCOUNTER — TELEPHONE (OUTPATIENT)
Dept: INTERNAL MEDICINE | Facility: CLINIC | Age: 25
End: 2020-09-28

## 2020-09-28 DIAGNOSIS — Z00.00 ANNUAL PHYSICAL EXAM: Primary | ICD-10-CM

## 2020-09-28 LAB — HCV AB SERPL QL IA: NEGATIVE

## 2020-09-28 NOTE — TELEPHONE ENCOUNTER
This test was supposed to be by urine. I did not collect it by tube.  Please advise pt needs to be done over. New order in. Thanks.

## 2020-09-28 NOTE — TELEPHONE ENCOUNTER
Spoke with lab services, they stated the C. trachomatis/N. gonorrhoeae by AMP DNA [[id number]] canceled due to it being collected in a [place] (incorrect specimen). Please review and advise. Pt has appt tomorrow with .

## 2020-09-28 NOTE — TELEPHONE ENCOUNTER
----- Message from Deloris Vega MD sent at 9/28/2020  9:40 AM CDT -----  Regarding: RE: Lab Client Services  Contact: [phone]  I sent message earlier regarding this. Thanks.  ----- Message -----  From: Katarina Manzano  Sent: 9/27/2020   9:03 PM CDT  To: Deloris Vega MD, [facility] Staff  Subject: Lab Client Services                              Good Morning,    My name is Katarina Manzano I work in the Lab Client Services. We had a problem with some lab work on this patient. If someone from your office could call us at [phone] or ext. [phone] that would be great. Anyone in my department can help.     Thank you

## 2020-09-28 NOTE — TELEPHONE ENCOUNTER
----- Message from Katarina Manzano sent at 9/27/2020  9:03 PM CDT -----  Regarding: Lab Client Services  Contact: [phone]  Good Morning,    My name is Katarina Manzano I work in the Lab Client Services. We had a problem with some lab work on this patient. If someone from your office could call us at [phone] or ext. [phone] that would be great. Anyone in my department can help.     Thank you

## 2021-04-29 ENCOUNTER — PATIENT MESSAGE (OUTPATIENT)
Dept: RESEARCH | Facility: HOSPITAL | Age: 26
End: 2021-04-29

## 2022-05-02 ENCOUNTER — PATIENT MESSAGE (OUTPATIENT)
Dept: ADMINISTRATIVE | Facility: HOSPITAL | Age: 27
End: 2022-05-02
Payer: COMMERCIAL

## 2022-07-01 ENCOUNTER — PATIENT OUTREACH (OUTPATIENT)
Dept: ADMINISTRATIVE | Facility: HOSPITAL | Age: 27
End: 2022-07-01
Payer: COMMERCIAL

## 2022-08-12 ENCOUNTER — LAB VISIT (OUTPATIENT)
Dept: LAB | Facility: HOSPITAL | Age: 27
End: 2022-08-12
Attending: FAMILY MEDICINE
Payer: COMMERCIAL

## 2022-08-12 ENCOUNTER — OFFICE VISIT (OUTPATIENT)
Dept: FAMILY MEDICINE | Facility: CLINIC | Age: 27
End: 2022-08-12
Payer: COMMERCIAL

## 2022-08-12 VITALS
HEIGHT: 68 IN | SYSTOLIC BLOOD PRESSURE: 110 MMHG | BODY MASS INDEX: 37.99 KG/M2 | HEART RATE: 92 BPM | OXYGEN SATURATION: 97 % | TEMPERATURE: 97 F | DIASTOLIC BLOOD PRESSURE: 88 MMHG | RESPIRATION RATE: 18 BRPM | WEIGHT: 250.69 LBS

## 2022-08-12 DIAGNOSIS — Z00.00 ANNUAL PHYSICAL EXAM: ICD-10-CM

## 2022-08-12 DIAGNOSIS — E66.01 SEVERE OBESITY (BMI 35.0-39.9) WITH COMORBIDITY: ICD-10-CM

## 2022-08-12 DIAGNOSIS — Z00.00 ANNUAL PHYSICAL EXAM: Primary | ICD-10-CM

## 2022-08-12 DIAGNOSIS — Z30.41 ENCOUNTER FOR SURVEILLANCE OF CONTRACEPTIVE PILLS: ICD-10-CM

## 2022-08-12 LAB
B-HCG UR QL: NEGATIVE
BASOPHILS # BLD AUTO: 0.04 K/UL (ref 0–0.2)
BASOPHILS NFR BLD: 0.8 % (ref 0–1.9)
CTP QC/QA: YES
DIFFERENTIAL METHOD: NORMAL
EOSINOPHIL # BLD AUTO: 0.1 K/UL (ref 0–0.5)
EOSINOPHIL NFR BLD: 1.6 % (ref 0–8)
ERYTHROCYTE [DISTWIDTH] IN BLOOD BY AUTOMATED COUNT: 13.9 % (ref 11.5–14.5)
HCT VFR BLD AUTO: 38.6 % (ref 37–48.5)
HGB BLD-MCNC: 12.4 G/DL (ref 12–16)
IMM GRANULOCYTES # BLD AUTO: 0 K/UL (ref 0–0.04)
IMM GRANULOCYTES NFR BLD AUTO: 0 % (ref 0–0.5)
LYMPHOCYTES # BLD AUTO: 1.9 K/UL (ref 1–4.8)
LYMPHOCYTES NFR BLD: 38.8 % (ref 18–48)
MCH RBC QN AUTO: 28.4 PG (ref 27–31)
MCHC RBC AUTO-ENTMCNC: 32.1 G/DL (ref 32–36)
MCV RBC AUTO: 89 FL (ref 82–98)
MONOCYTES # BLD AUTO: 0.6 K/UL (ref 0.3–1)
MONOCYTES NFR BLD: 11.2 % (ref 4–15)
NEUTROPHILS # BLD AUTO: 2.3 K/UL (ref 1.8–7.7)
NEUTROPHILS NFR BLD: 47.6 % (ref 38–73)
NRBC BLD-RTO: 0 /100 WBC
PLATELET # BLD AUTO: 292 K/UL (ref 150–450)
PMV BLD AUTO: 10.9 FL (ref 9.2–12.9)
RBC # BLD AUTO: 4.36 M/UL (ref 4–5.4)
WBC # BLD AUTO: 4.92 K/UL (ref 3.9–12.7)

## 2022-08-12 PROCEDURE — 99395 PR PREVENTIVE VISIT,EST,18-39: ICD-10-PCS | Mod: S$GLB,,, | Performed by: FAMILY MEDICINE

## 2022-08-12 PROCEDURE — 99999 PR PBB SHADOW E&M-EST. PATIENT-LVL IV: CPT | Mod: PBBFAC,,, | Performed by: FAMILY MEDICINE

## 2022-08-12 PROCEDURE — 99999 PR PBB SHADOW E&M-EST. PATIENT-LVL IV: ICD-10-PCS | Mod: PBBFAC,,, | Performed by: FAMILY MEDICINE

## 2022-08-12 PROCEDURE — 87624 HPV HI-RISK TYP POOLED RSLT: CPT | Performed by: FAMILY MEDICINE

## 2022-08-12 PROCEDURE — 85025 COMPLETE CBC W/AUTO DIFF WBC: CPT | Performed by: FAMILY MEDICINE

## 2022-08-12 PROCEDURE — 84443 ASSAY THYROID STIM HORMONE: CPT | Performed by: FAMILY MEDICINE

## 2022-08-12 PROCEDURE — 99395 PREV VISIT EST AGE 18-39: CPT | Mod: S$GLB,,, | Performed by: FAMILY MEDICINE

## 2022-08-12 PROCEDURE — 88175 CYTOPATH C/V AUTO FLUID REDO: CPT | Performed by: FAMILY MEDICINE

## 2022-08-12 PROCEDURE — 81025 POCT URINE PREGNANCY: ICD-10-PCS | Mod: S$GLB,,, | Performed by: FAMILY MEDICINE

## 2022-08-12 PROCEDURE — 80061 LIPID PANEL: CPT | Performed by: FAMILY MEDICINE

## 2022-08-12 PROCEDURE — 80053 COMPREHEN METABOLIC PANEL: CPT | Performed by: FAMILY MEDICINE

## 2022-08-12 PROCEDURE — 36415 COLL VENOUS BLD VENIPUNCTURE: CPT | Mod: PO | Performed by: FAMILY MEDICINE

## 2022-08-12 PROCEDURE — 81025 URINE PREGNANCY TEST: CPT | Mod: S$GLB,,, | Performed by: FAMILY MEDICINE

## 2022-08-12 RX ORDER — NORGESTIMATE AND ETHINYL ESTRADIOL 7DAYSX3 28
1 KIT ORAL DAILY
Qty: 28 TABLET | Refills: 11 | Status: SHIPPED | OUTPATIENT
Start: 2022-08-12 | End: 2022-11-01 | Stop reason: SDUPTHER

## 2022-08-12 NOTE — PROGRESS NOTES
HISTORY OF PRESENT ILLNESS:  Miss Burger comes in today for annual wellness examination.  She is fasting and has not taken medications today.  She reports no acute problems today.     END OF LIFE DECISION: She does not have a living will and desires life support.    Current Outpatient Medications   Medication Sig    amphetamine sulfate (EVEKEO) 10 mg Tab Take by mouth once a week. Prescribed by Dr. Tristan, psychiatry    norgestimate-ethinyl estradioL (TRINESSA, 28,) 0.18/0.215/0.25 mg-35 mcg (28) tablet Take 1 tablet by mouth once daily. (Patient not taking: Reported on 8/12/2022)      SCREENINGS:       Cholesterol: January 23, 2018.      FFS/Colonoscopy: Never.     Mammogram:  Never.     GYN Exam: September 26, 2020 - okay. January 23, 2018 pap smear - okay. Pap smear due 2021.      Dexa Scan:  N./A.     Eye Exam: 2020 per patient.      PPD: Never.     Immunizations: Tdap - December 17, 2009.Td: September 26, 2020.                              Gardasil - May 18, 2016, January 23, 2018.  September 26, 2020.                                Menactra - December 17, 2009, July 18, 2016.                              Zostavax - N./A.                              Pneumovax - Never.                              Seasonal Flu - September 26, 2020.                                      Covid-19 (Pfizer) vaccine series - June 21, 2021, January 14, 2022.                 ROS:  GENERAL: Denies fever, chills, fatigue or unusual weight change. Appetite normal. Monitors diet. Exercises 3 to 4 times per week, 30 to 60 minutes each time. Weight 116.8 kg (257 lb 9.7 oz) at July 23, 2020 visit.  SKIN: Denies rashes, itching, changes in mole, color or texture of skin or easy bruising.  HEAD:  Denies headaches or recent head trauma.  EYES: Denies change in vision, pain, diplopia, redness or discharge.  EARS: Denies ear pain, discharge, vertigo or decreased hearing.  NOSE: Denies loss of smell, epistaxis or rhinitis.  MOUTH & THROAT: Denies  "hoarseness or change in voice. Denies excessive gum bleeding or mouth sores.  Denies sore throat.  NODES: Denies swollen glands.  CHEST: Denies LOPEZ, wheezing, cough, or sputum production.  CARDIOVASCULAR: Denies chest pain, PND, orthopnea or reduced exercise tolerance.  Denies palpitations.  ABDOMEN: Denies diarrhea, constipation, nausea, vomiting, abdominal pain, or blood in stool.  URINARY: Denies flank pain, dysuria or hematuria.  GENITOURINARY: Denies flank pain, dysuria, frequency or hematuria. Reports monthly menses when takes OCP but reports currently off OCP and requests OCP as desires regular menses again. Reports uses condoms always and reports last sexually activity occurred appx. 2 months ago. Does not perform monthly breast self examinations.  ENDOCRINE: Denies thyroid, cholesterol or diabetes problems.  HEME/LYMPH: Denies bleeding problems.  PERIPHERAL VASCULAR:Denies claudication or cyanosis.  MUSCULOSKELETAL: Denies joint stiffness, pain or swelling. Denies edema.  NEUROLOGIC: Denies history of seizures, tremors, paralysis, alteration of gait or coordination.  PSYCHIATRIC: Denies mood swings, depression, anxiety, homicidal or suicidal thoughts. Denies sleep problems. Follows with Dr. Mariah Tristan, psychiatrist with [facility], every 5 months for treatment of ADHD.      PE:   VS: Blood Pressure 110/88   Pulse 92   Temperature 96.5 °F (35.8 °C)   Respiration 18   Height 5' 8" (1.727 m)   Weight 113.7 kg (250 lb 10.6 oz)   Last Menstrual Period 06/15/2022 Comment: irregular as off OCP  Oxygen Saturation 97%   Body Mass Index 38.11 kg/m²   APPEARANCE:  Well nourished, well developed female, obese and pleasant, alert and oriented in no acute distress.    HEAD: Nontender. Full range of motion.  EYES: PERRL, conjunctiva pink, lids no edema.  EARS: External canal patent, no swelling or redness. TM's shiny and clear.  NOSE: Mucosa and turbinates pink, not swollen. No discharge. Nontender " sinuses.  THROAT: No pharyngeal erythema or exudate. No stridor.   NECK: Supple, no mass, thyroid not enlarged.  NODES: No cervical, axillary or inguinal lymph node enlargement.  CHEST: Normal respiratory effort. Lungs clear to auscultation.  CARDIOVASCULAR: Normal S1, S2. No rubs, murmurs or gallops. PMI not displaced. No carotid bruit. Pedal pulses palpable bilaterally. No edema.  ABDOMEN: Bowel sounds present. Not distended. Soft. No tenderness, masses or organomegaly.  BREAST EXAM: Symmetrical, no external lesions, no discharge, no masses palpated.        PELVIC EXAM: No external lesions noted, no discharge, cervix appears normal, bimanual exam showed no uterine abnormalities and no adnexal masses. Urethra and bladder intact.  RECTAL EXAM: Not examined.  MUSCULOSKELETAL: No joint deformities or stiffness. She is ambulatory without problems.  SKIN: No rashes or suspicious lesions, normal color and turgor.  NEUROLOGIC:   Cranial Nerves: II-XII grossly intact.   DTR's: Knees, Ankles 2+ and equal bilaterally. Gait & Posture: Normal gait and fine motion.  PSYCHIATRIC: Patient alert, oriented x 3. Mood/Affect normal without acute anxiety or depression noted. Judgment/insight good as she makes appropriate decisions during today's examination.      Urine pregnancy test ordered by me today - (-).     ASSESSMENT:    ICD-10-CM ICD-9-CM    1. Annual physical exam  Z00.00 V70.0 CBC Auto Differential      TSH      Comprehensive Metabolic Panel      Lipid Panel      Liquid-Based Pap Smear, Screening      HPV High Risk Genotypes, PCR   2. Encounter for surveillance of contraceptive pills  Z30.41 V25.41 POCT Urine Pregnancy      norgestimate-ethinyl estradioL (TRINESSA, 28,) 0.18/0.215/0.25 mg-35 mcg (28) tablet   3. Severe obesity (BMI 35.0-39.9) with comorbidity  E66.01 278.01      PLAN:  1. Age-appropriate counseling-appropriate low-sodium, low-cholesterol, low carbohydrate diet and exercise daily, monthly breast self exam,  annual wellness examination.   2. Patient advised to call for results.  3. Continue current medications.  4. Restart Trinessa daily - Sunday start as directed; medication precautions discussed with patient.  5. Keep follow up with specialists.  6. Flu shot this fall.  7. Follow up in about 1 year (around 8/12/2023) for physical.

## 2022-08-13 LAB
ALBUMIN SERPL BCP-MCNC: 4 G/DL (ref 3.5–5.2)
ALP SERPL-CCNC: 56 U/L (ref 55–135)
ALT SERPL W/O P-5'-P-CCNC: 12 U/L (ref 10–44)
ANION GAP SERPL CALC-SCNC: 6 MMOL/L (ref 8–16)
AST SERPL-CCNC: 16 U/L (ref 10–40)
BILIRUB SERPL-MCNC: 0.4 MG/DL (ref 0.1–1)
BUN SERPL-MCNC: 9 MG/DL (ref 6–20)
CALCIUM SERPL-MCNC: 9.6 MG/DL (ref 8.7–10.5)
CHLORIDE SERPL-SCNC: 103 MMOL/L (ref 95–110)
CHOLEST SERPL-MCNC: 181 MG/DL (ref 120–199)
CHOLEST/HDLC SERPL: 3.5 {RATIO} (ref 2–5)
CO2 SERPL-SCNC: 27 MMOL/L (ref 23–29)
CREAT SERPL-MCNC: 0.8 MG/DL (ref 0.5–1.4)
EST. GFR  (NO RACE VARIABLE): >60 ML/MIN/1.73 M^2
GLUCOSE SERPL-MCNC: 77 MG/DL (ref 70–110)
HDLC SERPL-MCNC: 52 MG/DL (ref 40–75)
HDLC SERPL: 28.7 % (ref 20–50)
LDLC SERPL CALC-MCNC: 115.6 MG/DL (ref 63–159)
NONHDLC SERPL-MCNC: 129 MG/DL
POTASSIUM SERPL-SCNC: 4.3 MMOL/L (ref 3.5–5.1)
PROT SERPL-MCNC: 7.4 G/DL (ref 6–8.4)
SODIUM SERPL-SCNC: 136 MMOL/L (ref 136–145)
TRIGL SERPL-MCNC: 67 MG/DL (ref 30–150)
TSH SERPL DL<=0.005 MIU/L-ACNC: 0.66 UIU/ML (ref 0.4–4)

## 2022-08-17 LAB
HPV HR 12 DNA SPEC QL NAA+PROBE: NEGATIVE
HPV16 AG SPEC QL: NEGATIVE
HPV18 DNA SPEC QL NAA+PROBE: NEGATIVE

## 2022-08-18 LAB
FINAL PATHOLOGIC DIAGNOSIS: NORMAL
Lab: NORMAL

## 2022-11-02 ENCOUNTER — OFFICE VISIT (OUTPATIENT)
Dept: DERMATOLOGY | Facility: CLINIC | Age: 27
End: 2022-11-02
Payer: COMMERCIAL

## 2022-11-02 DIAGNOSIS — L83 CONFLUENT AND RETICULATE PAPILLOMATOSIS: ICD-10-CM

## 2022-11-02 DIAGNOSIS — L83 ACQUIRED ACANTHOSIS NIGRICANS: Primary | ICD-10-CM

## 2022-11-02 DIAGNOSIS — L70.0 ACNE VULGARIS: ICD-10-CM

## 2022-11-02 DIAGNOSIS — L21.9 SEBORRHEIC DERMATITIS: ICD-10-CM

## 2022-11-02 PROCEDURE — 99999 PR PBB SHADOW E&M-EST. PATIENT-LVL III: CPT | Mod: PBBFAC,,, | Performed by: DERMATOLOGY

## 2022-11-02 PROCEDURE — 99999 PR PBB SHADOW E&M-EST. PATIENT-LVL III: ICD-10-PCS | Mod: PBBFAC,,, | Performed by: DERMATOLOGY

## 2022-11-02 PROCEDURE — 99204 PR OFFICE/OUTPT VISIT, NEW, LEVL IV, 45-59 MIN: ICD-10-PCS | Mod: S$GLB,,, | Performed by: DERMATOLOGY

## 2022-11-02 PROCEDURE — 99204 OFFICE O/P NEW MOD 45 MIN: CPT | Mod: S$GLB,,, | Performed by: DERMATOLOGY

## 2022-11-02 RX ORDER — TRETINOIN 0.5 MG/G
CREAM TOPICAL
Qty: 20 G | Refills: 6 | Status: SHIPPED | OUTPATIENT
Start: 2022-11-02 | End: 2023-11-02

## 2022-11-02 RX ORDER — CLOBETASOL PROPIONATE 0.5 MG/G
AEROSOL, FOAM TOPICAL
Qty: 50 G | Refills: 5 | Status: SHIPPED | OUTPATIENT
Start: 2022-11-02 | End: 2023-08-14

## 2022-11-02 RX ORDER — AMMONIUM LACTATE 12 G/100G
LOTION TOPICAL
Qty: 225 G | Refills: 11 | Status: SHIPPED | OUTPATIENT
Start: 2022-11-02

## 2022-11-02 RX ORDER — KETOCONAZOLE 20 MG/ML
SHAMPOO, SUSPENSION TOPICAL
Qty: 120 ML | Refills: 5 | Status: SHIPPED | OUTPATIENT
Start: 2022-11-02

## 2022-11-02 NOTE — Clinical Note
Dr. Gary Lozoya.  She is showing signs of acanthosis of the neck and inframammary, I see her last glucose was completely normal (looking for possible insulin resistance).  However, she does have irregular cycles, do you think she needs PCOS w/u?  Thanks,  Jackie

## 2022-11-02 NOTE — PROGRESS NOTES
Subjective:       Patient ID:  Ingrid Burger is a 27 y.o. female who presents for   Chief Complaint   Patient presents with    Dry Skin     History of Present Illness: The patient presents with chief complaint of dry skin.  Location: chest, arms and scalp  Duration: years   Signs/Symptoms: itching     Prior treatments: lotions      + irregular menses, last glucose = 77    Review of Systems   Constitutional:  Negative for fever and chills.   Gastrointestinal:  Negative for nausea and vomiting.   Skin:  Positive for itching, rash, dry skin and activity-related sunscreen use. Negative for daily sunscreen use and recent sunburn.   Hematologic/Lymphatic: Does not bruise/bleed easily.      Objective:    Physical Exam   Constitutional: She appears well-developed and well-nourished. No distress.   Neurological: She is alert and oriented to person, place, and time. She is not disoriented.   Psychiatric: She has a normal mood and affect.   Skin:   Areas Examined (abnormalities noted in diagram):   Head / Face Inspection Performed  Neck Inspection Performed  Chest / Axilla Inspection Performed  Back Inspection Performed  RUE Inspected  LUE Inspection Performed                  Assessment / Plan:        Acquired acanthosis nigricans  Confluent and reticulate papillomatosis  -     ammonium lactate (LAC-HYDRIN) 12 % lotion; Apply small amount every other night after bathing.  Dispense: 225 g; Refill: 11  -     CARP of sternal chest with acnathosis of neck and inframammary/fold areas.  Will message Dr. Vega regarding possibility of PCOS given acanthosis and irregular cycles, although recent lab work does not indicate insulin resistance.  Will alternate amlactin and tretinoin qHS to areas.    Seborrheic dermatitis  -     ketoconazole (NIZORAL) 2 % shampoo; Wash hair with medicated shampoo at least 1x/week - let sit on scalp at least 5 minutes prior to rinsing  Dispense: 120 mL; Refill: 5  -     clobetasoL (OLUX) 0.05 %  Foam; AAA of scalp 1-2 times daily  Dispense: 50 g; Refill: 5  -     mild today of scalp, will start above med.     Acne vulgaris  -     tretinoin (RETIN-A) 0.05 % cream; Apply small amount every other night  Dispense: 20 g; Refill: 6           Follow up in about 3 months (around 2/2/2023).

## 2023-06-09 ENCOUNTER — OFFICE VISIT (OUTPATIENT)
Dept: FAMILY MEDICINE | Facility: CLINIC | Age: 28
End: 2023-06-09
Payer: COMMERCIAL

## 2023-06-09 VITALS
DIASTOLIC BLOOD PRESSURE: 80 MMHG | BODY MASS INDEX: 36.45 KG/M2 | HEART RATE: 88 BPM | WEIGHT: 240.5 LBS | TEMPERATURE: 99 F | OXYGEN SATURATION: 98 % | RESPIRATION RATE: 18 BRPM | HEIGHT: 68 IN | SYSTOLIC BLOOD PRESSURE: 124 MMHG

## 2023-06-09 DIAGNOSIS — N89.8 VAGINAL DISCHARGE: Primary | ICD-10-CM

## 2023-06-09 PROCEDURE — 1160F RVW MEDS BY RX/DR IN RCRD: CPT | Mod: CPTII,S$GLB,, | Performed by: FAMILY MEDICINE

## 2023-06-09 PROCEDURE — 3074F PR MOST RECENT SYSTOLIC BLOOD PRESSURE < 130 MM HG: ICD-10-PCS | Mod: CPTII,S$GLB,, | Performed by: FAMILY MEDICINE

## 2023-06-09 PROCEDURE — 3079F DIAST BP 80-89 MM HG: CPT | Mod: CPTII,S$GLB,, | Performed by: FAMILY MEDICINE

## 2023-06-09 PROCEDURE — 3008F PR BODY MASS INDEX (BMI) DOCUMENTED: ICD-10-PCS | Mod: CPTII,S$GLB,, | Performed by: FAMILY MEDICINE

## 2023-06-09 PROCEDURE — 99213 PR OFFICE/OUTPT VISIT, EST, LEVL III, 20-29 MIN: ICD-10-PCS | Mod: S$GLB,,, | Performed by: FAMILY MEDICINE

## 2023-06-09 PROCEDURE — 3079F PR MOST RECENT DIASTOLIC BLOOD PRESSURE 80-89 MM HG: ICD-10-PCS | Mod: CPTII,S$GLB,, | Performed by: FAMILY MEDICINE

## 2023-06-09 PROCEDURE — 81514 NFCT DS BV&VAGINITIS DNA ALG: CPT | Performed by: FAMILY MEDICINE

## 2023-06-09 PROCEDURE — 3074F SYST BP LT 130 MM HG: CPT | Mod: CPTII,S$GLB,, | Performed by: FAMILY MEDICINE

## 2023-06-09 PROCEDURE — 1160F PR REVIEW ALL MEDS BY PRESCRIBER/CLIN PHARMACIST DOCUMENTED: ICD-10-PCS | Mod: CPTII,S$GLB,, | Performed by: FAMILY MEDICINE

## 2023-06-09 PROCEDURE — 99999 PR PBB SHADOW E&M-EST. PATIENT-LVL IV: CPT | Mod: PBBFAC,,, | Performed by: FAMILY MEDICINE

## 2023-06-09 PROCEDURE — 3008F BODY MASS INDEX DOCD: CPT | Mod: CPTII,S$GLB,, | Performed by: FAMILY MEDICINE

## 2023-06-09 PROCEDURE — 99213 OFFICE O/P EST LOW 20 MIN: CPT | Mod: S$GLB,,, | Performed by: FAMILY MEDICINE

## 2023-06-09 PROCEDURE — 1159F MED LIST DOCD IN RCRD: CPT | Mod: CPTII,S$GLB,, | Performed by: FAMILY MEDICINE

## 2023-06-09 PROCEDURE — 1159F PR MEDICATION LIST DOCUMENTED IN MEDICAL RECORD: ICD-10-PCS | Mod: CPTII,S$GLB,, | Performed by: FAMILY MEDICINE

## 2023-06-09 PROCEDURE — 87591 N.GONORRHOEAE DNA AMP PROB: CPT | Performed by: FAMILY MEDICINE

## 2023-06-09 PROCEDURE — 99999 PR PBB SHADOW E&M-EST. PATIENT-LVL IV: ICD-10-PCS | Mod: PBBFAC,,, | Performed by: FAMILY MEDICINE

## 2023-06-09 RX ORDER — AMPHETAMINE 9.4 MG/1
1 TABLET, ORALLY DISINTEGRATING ORAL EVERY MORNING
COMMUNITY
Start: 2023-04-26

## 2023-06-09 NOTE — PROGRESS NOTES
Ingrid Burger    Chief Complaint   Patient presents with    Vaginitis       History of Present Illness:     Ms. Burger comes in today with complaint of having fish she and odorous, white vaginal discharge with irritation in her vaginal area.  She states she does not douche.  She states she is sexually active with 1 partner and does not use barrier protection.  She states she was diagnosed and treated for chlamydia in December 2022.    Otherwise, she denies having fever, chills, fatigue, appetite changes; shortness of breath, cough, wheezing; chest pain, palpitations, leg swelling; abdominal pain, nausea, vomiting, diarrhea, constipation; unusual urinary symptoms; polydipsia, polyphagia, polyuria, hot or cold intolerance; back pain; headache; anxiety, depression, homicidal or suicidal thoughts.           Current Outpatient Medications   Medication Sig    ADZENYS XR-ODT 9.4 mg TbLB Take 1 tablet by mouth every morning.    ammonium lactate (LAC-HYDRIN) 12 % lotion Apply small amount every other night after bathing.    clobetasoL (OLUX) 0.05 % Foam AAA of scalp 1-2 times daily    ketoconazole (NIZORAL) 2 % shampoo Wash hair with medicated shampoo at least 1x/week - let sit on scalp at least 5 minutes prior to rinsing    tretinoin (RETIN-A) 0.05 % cream Apply small amount every other night    norgestimate-ethinyl estradioL (TRINESSA, 28,) 0.18/0.215/0.25 mg-35 mcg (28) tablet Take 1 tablet by mouth once daily. (Patient not taking: Reported on 6/9/2023)       Review of Systems   Constitutional:  Negative for activity change, appetite change, chills, fatigue, fever and unexpected weight change.   Eyes:  Negative for discharge and visual disturbance.   Respiratory:  Negative for cough, chest tightness, shortness of breath and wheezing.    Cardiovascular:  Negative for chest pain, palpitations and leg swelling.   Gastrointestinal:  Negative for abdominal pain, blood in stool, constipation, diarrhea, nausea and  vomiting.   Endocrine: Negative for cold intolerance, heat intolerance, polydipsia, polyphagia and polyuria.   Genitourinary:  Positive for vaginal discharge. Negative for difficulty urinating and menstrual problem.   Musculoskeletal:  Negative for arthralgias, back pain, joint swelling and neck pain.   Neurological:  Negative for weakness, numbness and headaches.   Psychiatric/Behavioral:  Negative for confusion, dysphoric mood and suicidal ideas. The patient is not nervous/anxious.         Negative for homicidal ideas.     Objective:  Physical Exam  Vitals reviewed.   Constitutional:       General: She is not in acute distress.     Appearance: Normal appearance. She is not ill-appearing, toxic-appearing or diaphoretic.      Comments: Pleasant.   Cardiovascular:      Rate and Rhythm: Normal rate and regular rhythm.      Pulses: Normal pulses.      Heart sounds: No murmur heard.  Pulmonary:      Effort: Pulmonary effort is normal. No respiratory distress.      Breath sounds: Normal breath sounds. No wheezing.   Abdominal:      General: Bowel sounds are normal. There is no distension.      Palpations: Abdomen is soft. There is no mass.      Tenderness: There is no abdominal tenderness. There is no guarding or rebound.      Hernia: There is no hernia in the left inguinal area or right inguinal area.   Genitourinary:     General: Normal vulva.      Labia:         Right: No lesion.         Left: No lesion.       Urethra: No prolapse, urethral pain, urethral swelling or urethral lesion.      Vagina: No foreign body. Vaginal discharge present. No erythema or bleeding.      Cervix: No cervical motion tenderness, discharge, friability, lesion or erythema.      Uterus: Not enlarged and not tender.       Adnexa:         Right: No mass, tenderness or fullness.          Left: No mass, tenderness or fullness.        Comments: Thin, white discharge in vaginal vault.  Musculoskeletal:         General: No swelling or tenderness.  Normal range of motion.      Cervical back: Normal range of motion and neck supple. No tenderness.      Comments: She is ambulatory without problems.   Lymphadenopathy:      Cervical: No cervical adenopathy.      Lower Body: No right inguinal adenopathy. No left inguinal adenopathy.   Skin:     General: Skin is warm.   Neurological:      General: No focal deficit present.      Mental Status: She is alert and oriented to person, place, and time.   Psychiatric:         Mood and Affect: Mood normal.         Behavior: Behavior normal.         Thought Content: Thought content normal.         Judgment: Judgment normal.       ASSESSMENT:  1. Vaginal discharge        PLAN:  Ingrid was seen today for vaginitis.    Diagnoses and all orders for this visit:    Vaginal discharge  -     C. trachomatis/N. gonorrhoeae by AMP DNA  -     Vaginosis Screen by DNA Probe      Patient advised to call for results.  Continue current medications, follow low sodium, low cholesterol, low carb diet, daily walks.  Sex safe practices advised.  Follow up if symptoms worsen or fail to improve.

## 2023-06-12 ENCOUNTER — PATIENT MESSAGE (OUTPATIENT)
Dept: FAMILY MEDICINE | Facility: CLINIC | Age: 28
End: 2023-06-12
Payer: COMMERCIAL

## 2023-06-12 LAB
BACTERIAL VAGINOSIS DNA: POSITIVE
C TRACH DNA SPEC QL NAA+PROBE: NOT DETECTED
CANDIDA GLABRATA DNA: NEGATIVE
CANDIDA KRUSEI DNA: NEGATIVE
CANDIDA RRNA VAG QL PROBE: NEGATIVE
N GONORRHOEA DNA SPEC QL NAA+PROBE: NOT DETECTED
T VAGINALIS RRNA GENITAL QL PROBE: NEGATIVE

## 2023-06-13 ENCOUNTER — PATIENT MESSAGE (OUTPATIENT)
Dept: RESEARCH | Facility: HOSPITAL | Age: 28
End: 2023-06-13
Payer: COMMERCIAL

## 2023-06-13 ENCOUNTER — TELEPHONE (OUTPATIENT)
Dept: FAMILY MEDICINE | Facility: CLINIC | Age: 28
End: 2023-06-13

## 2023-06-15 DIAGNOSIS — B96.89 BV (BACTERIAL VAGINOSIS): Primary | ICD-10-CM

## 2023-06-15 DIAGNOSIS — N76.0 BV (BACTERIAL VAGINOSIS): Primary | ICD-10-CM

## 2023-06-15 RX ORDER — METRONIDAZOLE 500 MG/1
500 TABLET ORAL EVERY 12 HOURS
Qty: 14 TABLET | Refills: 0 | Status: SHIPPED | OUTPATIENT
Start: 2023-06-15 | End: 2023-06-22

## 2023-07-19 ENCOUNTER — PATIENT MESSAGE (OUTPATIENT)
Dept: RESEARCH | Facility: HOSPITAL | Age: 28
End: 2023-07-19
Payer: COMMERCIAL

## 2023-08-14 ENCOUNTER — OFFICE VISIT (OUTPATIENT)
Dept: FAMILY MEDICINE | Facility: CLINIC | Age: 28
End: 2023-08-14
Payer: COMMERCIAL

## 2023-08-14 ENCOUNTER — LAB VISIT (OUTPATIENT)
Dept: LAB | Facility: HOSPITAL | Age: 28
End: 2023-08-14
Attending: FAMILY MEDICINE
Payer: COMMERCIAL

## 2023-08-14 VITALS
RESPIRATION RATE: 18 BRPM | HEIGHT: 68 IN | BODY MASS INDEX: 35.28 KG/M2 | HEART RATE: 92 BPM | WEIGHT: 232.81 LBS | TEMPERATURE: 98 F | DIASTOLIC BLOOD PRESSURE: 84 MMHG | SYSTOLIC BLOOD PRESSURE: 124 MMHG | OXYGEN SATURATION: 98 %

## 2023-08-14 DIAGNOSIS — Z00.00 ANNUAL PHYSICAL EXAM: Primary | ICD-10-CM

## 2023-08-14 DIAGNOSIS — E66.01 SEVERE OBESITY (BMI 35.0-35.9 WITH COMORBIDITY): ICD-10-CM

## 2023-08-14 DIAGNOSIS — Z00.00 ANNUAL PHYSICAL EXAM: ICD-10-CM

## 2023-08-14 PROBLEM — Z30.41 ENCOUNTER FOR SURVEILLANCE OF CONTRACEPTIVE PILLS: Status: RESOLVED | Noted: 2018-01-23 | Resolved: 2023-08-14

## 2023-08-14 LAB
ALBUMIN SERPL BCP-MCNC: 3.8 G/DL (ref 3.5–5.2)
ALP SERPL-CCNC: 56 U/L (ref 55–135)
ALT SERPL W/O P-5'-P-CCNC: 18 U/L (ref 10–44)
ANION GAP SERPL CALC-SCNC: 7 MMOL/L (ref 8–16)
AST SERPL-CCNC: 21 U/L (ref 10–40)
BASOPHILS # BLD AUTO: 0.04 K/UL (ref 0–0.2)
BASOPHILS NFR BLD: 0.7 % (ref 0–1.9)
BILIRUB SERPL-MCNC: 0.4 MG/DL (ref 0.1–1)
BUN SERPL-MCNC: 13 MG/DL (ref 6–20)
CALCIUM SERPL-MCNC: 9.4 MG/DL (ref 8.7–10.5)
CHLORIDE SERPL-SCNC: 106 MMOL/L (ref 95–110)
CHOLEST SERPL-MCNC: 160 MG/DL (ref 120–199)
CHOLEST/HDLC SERPL: 3.4 {RATIO} (ref 2–5)
CO2 SERPL-SCNC: 25 MMOL/L (ref 23–29)
CREAT SERPL-MCNC: 0.9 MG/DL (ref 0.5–1.4)
DIFFERENTIAL METHOD: ABNORMAL
EOSINOPHIL # BLD AUTO: 0.1 K/UL (ref 0–0.5)
EOSINOPHIL NFR BLD: 2.4 % (ref 0–8)
ERYTHROCYTE [DISTWIDTH] IN BLOOD BY AUTOMATED COUNT: 14.6 % (ref 11.5–14.5)
EST. GFR  (NO RACE VARIABLE): >60 ML/MIN/1.73 M^2
GLUCOSE SERPL-MCNC: 67 MG/DL (ref 70–110)
HCT VFR BLD AUTO: 39.8 % (ref 37–48.5)
HDLC SERPL-MCNC: 47 MG/DL (ref 40–75)
HDLC SERPL: 29.4 % (ref 20–50)
HGB BLD-MCNC: 12.2 G/DL (ref 12–16)
IMM GRANULOCYTES # BLD AUTO: 0.01 K/UL (ref 0–0.04)
IMM GRANULOCYTES NFR BLD AUTO: 0.2 % (ref 0–0.5)
LDLC SERPL CALC-MCNC: 103.6 MG/DL (ref 63–159)
LYMPHOCYTES # BLD AUTO: 1.8 K/UL (ref 1–4.8)
LYMPHOCYTES NFR BLD: 30.4 % (ref 18–48)
MCH RBC QN AUTO: 28.3 PG (ref 27–31)
MCHC RBC AUTO-ENTMCNC: 30.7 G/DL (ref 32–36)
MCV RBC AUTO: 92 FL (ref 82–98)
MONOCYTES # BLD AUTO: 0.6 K/UL (ref 0.3–1)
MONOCYTES NFR BLD: 10.4 % (ref 4–15)
NEUTROPHILS # BLD AUTO: 3.3 K/UL (ref 1.8–7.7)
NEUTROPHILS NFR BLD: 55.9 % (ref 38–73)
NONHDLC SERPL-MCNC: 113 MG/DL
NRBC BLD-RTO: 0 /100 WBC
PLATELET # BLD AUTO: 260 K/UL (ref 150–450)
PMV BLD AUTO: 11.9 FL (ref 9.2–12.9)
POTASSIUM SERPL-SCNC: 4.8 MMOL/L (ref 3.5–5.1)
PROT SERPL-MCNC: 7.3 G/DL (ref 6–8.4)
RBC # BLD AUTO: 4.31 M/UL (ref 4–5.4)
SODIUM SERPL-SCNC: 138 MMOL/L (ref 136–145)
TRIGL SERPL-MCNC: 47 MG/DL (ref 30–150)
TSH SERPL DL<=0.005 MIU/L-ACNC: 1.01 UIU/ML (ref 0.4–4)
WBC # BLD AUTO: 5.95 K/UL (ref 3.9–12.7)

## 2023-08-14 PROCEDURE — 3079F PR MOST RECENT DIASTOLIC BLOOD PRESSURE 80-89 MM HG: ICD-10-PCS | Mod: CPTII,S$GLB,, | Performed by: FAMILY MEDICINE

## 2023-08-14 PROCEDURE — 3008F BODY MASS INDEX DOCD: CPT | Mod: CPTII,S$GLB,, | Performed by: FAMILY MEDICINE

## 2023-08-14 PROCEDURE — 85025 COMPLETE CBC W/AUTO DIFF WBC: CPT | Performed by: FAMILY MEDICINE

## 2023-08-14 PROCEDURE — 3074F PR MOST RECENT SYSTOLIC BLOOD PRESSURE < 130 MM HG: ICD-10-PCS | Mod: CPTII,S$GLB,, | Performed by: FAMILY MEDICINE

## 2023-08-14 PROCEDURE — 3074F SYST BP LT 130 MM HG: CPT | Mod: CPTII,S$GLB,, | Performed by: FAMILY MEDICINE

## 2023-08-14 PROCEDURE — 1160F PR REVIEW ALL MEDS BY PRESCRIBER/CLIN PHARMACIST DOCUMENTED: ICD-10-PCS | Mod: CPTII,S$GLB,, | Performed by: FAMILY MEDICINE

## 2023-08-14 PROCEDURE — 3079F DIAST BP 80-89 MM HG: CPT | Mod: CPTII,S$GLB,, | Performed by: FAMILY MEDICINE

## 2023-08-14 PROCEDURE — 1159F PR MEDICATION LIST DOCUMENTED IN MEDICAL RECORD: ICD-10-PCS | Mod: CPTII,S$GLB,, | Performed by: FAMILY MEDICINE

## 2023-08-14 PROCEDURE — 80061 LIPID PANEL: CPT | Performed by: FAMILY MEDICINE

## 2023-08-14 PROCEDURE — 99999 PR PBB SHADOW E&M-EST. PATIENT-LVL IV: CPT | Mod: PBBFAC,,, | Performed by: FAMILY MEDICINE

## 2023-08-14 PROCEDURE — 84443 ASSAY THYROID STIM HORMONE: CPT | Performed by: FAMILY MEDICINE

## 2023-08-14 PROCEDURE — 36415 COLL VENOUS BLD VENIPUNCTURE: CPT | Mod: PO | Performed by: FAMILY MEDICINE

## 2023-08-14 PROCEDURE — 80053 COMPREHEN METABOLIC PANEL: CPT | Performed by: FAMILY MEDICINE

## 2023-08-14 PROCEDURE — 99999 PR PBB SHADOW E&M-EST. PATIENT-LVL IV: ICD-10-PCS | Mod: PBBFAC,,, | Performed by: FAMILY MEDICINE

## 2023-08-14 PROCEDURE — 99395 PREV VISIT EST AGE 18-39: CPT | Mod: S$GLB,,, | Performed by: FAMILY MEDICINE

## 2023-08-14 PROCEDURE — 1160F RVW MEDS BY RX/DR IN RCRD: CPT | Mod: CPTII,S$GLB,, | Performed by: FAMILY MEDICINE

## 2023-08-14 PROCEDURE — 3008F PR BODY MASS INDEX (BMI) DOCUMENTED: ICD-10-PCS | Mod: CPTII,S$GLB,, | Performed by: FAMILY MEDICINE

## 2023-08-14 PROCEDURE — 99395 PR PREVENTIVE VISIT,EST,18-39: ICD-10-PCS | Mod: S$GLB,,, | Performed by: FAMILY MEDICINE

## 2023-08-14 PROCEDURE — 1159F MED LIST DOCD IN RCRD: CPT | Mod: CPTII,S$GLB,, | Performed by: FAMILY MEDICINE

## 2023-08-14 NOTE — PROGRESS NOTES
HISTORY OF PRESENT ILLNESS:  Miss Burger comes in today for annual wellness examination.  She is fasting and has not taken medications today.  She reports no acute problems today.     END OF LIFE DECISION: She does not have a living will and desires life support.    Current Outpatient Medications   Medication Sig    ADZENYS XR-ODT 9.4 mg TbLB Take 1 tablet by mouth every morning.    ammonium lactate (LAC-HYDRIN) 12 % lotion Apply small amount every other night after bathing.    ketoconazole (NIZORAL) 2 % shampoo Wash hair with medicated shampoo at least 1x/week - let sit on scalp at least 5 minutes prior to rinsing    tretinoin (RETIN-A) 0.05 % cream Apply small amount every other night      SCREENINGS:       Cholesterol: August 12, 2022.      FFS/Colonoscopy: Never.     Mammogram:  Never.     GYN Exam: August 12, 2022 pap smear, HPV  - okay. Pap smear due 2025.      Dexa Scan:  N./A.     Eye Exam: 2020 per patient.      PPD: Never.     Immunizations: Tdap - December 17, 2009.Td: September 26, 2020.                              Gardasil - May 18, 2016, January 23, 2018.  September 26, 2020.                                Menactra - December 17, 2009, July 18, 2016.                              Zostavax - N./A.                              Pneumovax - Never.                              Seasonal Flu - September 26, 2020.                                      Covid-19 (Pfizer) vaccine series - June 21, 2021, January 14, 2022.                 ROS:  GENERAL: Denies fever, chills, fatigue or unusual weight change. Appetite normal. Monitors diet. Exercises 5 to 6 times per week, 30 60 minutes each time. Weight 109.1 kg (240 lb 8.4 oz) at June 9, 2013 visit.  SKIN: Denies rashes, itching, changes in mole, color or texture of skin or easy bruising.  HEAD:  Denies headaches or recent head trauma.  EYES: Denies change in vision, pain, diplopia, redness or discharge.  EARS: Denies ear pain, discharge, vertigo or decreased  "hearing.  NOSE: Denies loss of smell, epistaxis or rhinitis.  MOUTH & THROAT: Denies hoarseness or change in voice. Denies excessive gum bleeding or mouth sores.  Denies sore throat.  NODES: Denies swollen glands.  CHEST: Denies LOPEZ, wheezing, cough, or sputum production.  CARDIOVASCULAR: Denies chest pain, PND, orthopnea or reduced exercise tolerance.  Denies palpitations.  ABDOMEN: Denies diarrhea, constipation, nausea, vomiting, abdominal pain, or blood in stool.  URINARY: Denies flank pain, dysuria or hematuria.  GENITOURINARY: Denies flank pain, dysuria, frequency or hematuria. Reports monthly menses now despite not taking OCP x 1 year and does not desire OCP again at this time. Does not perform monthly breast self examinations.  ENDOCRINE: Denies thyroid, cholesterol or diabetes problems.  HEME/LYMPH: Denies bleeding problems.  PERIPHERAL VASCULAR:Denies claudication or cyanosis.  MUSCULOSKELETAL: Denies joint stiffness, pain or swelling. Denies edema.  NEUROLOGIC: Denies history of seizures, tremors, paralysis, alteration of gait or coordination.  PSYCHIATRIC: Denies mood swings, depression, anxiety, homicidal or suicidal thoughts. Denies sleep problems. Follows with Dr. Mariah Tristan, psychiatrist with [facility], every 5 months for treatment of ADHD with last visit 2 weeks ago.      PE:   VS: Blood Pressure 124/84 (BP Location: Left arm, Patient Position: Sitting, BP Method: Medium (Manual))   Pulse 92   Temperature 97.6 °F (36.4 °C) (Temporal)   Respiration 18   Height 5' 8" (1.727 m)   Weight 105.6 kg (232 lb 12.9 oz)   Last Menstrual Period 07/02/2023 Comment: Monthly  Oxygen Saturation 98%   Body Mass Index 35.40 kg/m²   APPEARANCE:  Well nourished, well developed female, obese and pleasant, alert and oriented in no acute distress.    HEAD: Nontender. Full range of motion.  EYES: PERRL, conjunctiva pink, lids no edema.  EARS: External canal patent, no swelling or redness. TM's shiny and " clear.  NOSE: Mucosa and turbinates pink, not swollen. No discharge. Nontender sinuses.  THROAT: No pharyngeal erythema or exudate. No stridor.   NECK: Supple, no mass, thyroid not enlarged.  NODES: No cervical, axillary or inguinal lymph node enlargement.  CHEST: Normal respiratory effort. Lungs clear to auscultation.  CARDIOVASCULAR: Normal S1, S2. No rubs, murmurs or gallops. PMI not displaced. No carotid bruit. Pedal pulses palpable bilaterally. No edema.  ABDOMEN: Bowel sounds present. Not distended. Soft. No tenderness, masses or organomegaly.  BREAST EXAM: Symmetrical, no external lesions, no discharge, no masses palpated.        PELVIC EXAM: No external lesions noted, no discharge, cervix appears normal, bimanual exam showed no uterine abnormalities and no adnexal masses. Urethra and bladder intact.  RECTAL EXAM: Not examined.  MUSCULOSKELETAL: No joint deformities or stiffness. She is ambulatory without problems.  SKIN: No rashes or suspicious lesions, normal color and turgor.  NEUROLOGIC:   Cranial Nerves: II-XII grossly intact.   DTR's: Knees, Ankles 2+ and equal bilaterally. Gait & Posture: Normal gait and fine motion.  PSYCHIATRIC: Patient alert, oriented x 3. Mood/Affect normal without acute anxiety or depression noted. Judgment/insight good as she makes appropriate decisions during today's examination.      ASSESSMENT:    ICD-10-CM ICD-9-CM    1. Annual physical exam  Z00.00 V70.0 CBC Auto Differential      TSH      Comprehensive Metabolic Panel      Lipid Panel      2. Severe obesity (BMI 35.0-35.9 with comorbidity)  E66.01 278.01     Z68.35 V85.35           PLAN:  1. Age-appropriate counseling-appropriate low-sodium, low-cholesterol, low carbohydrate diet and exercise daily, monthly breast self exam, safe sex practices, annual wellness examination.   2. Patient advised to call for results.  3. Continue current medications.  4. Keep follow up with specialists.  5. Flu shot this fall.  6. Follow up in  about 1 year (around 8/14/2024) for physical.

## 2023-08-21 ENCOUNTER — PATIENT MESSAGE (OUTPATIENT)
Dept: RESEARCH | Facility: HOSPITAL | Age: 28
End: 2023-08-21
Payer: COMMERCIAL

## 2024-07-29 ENCOUNTER — PATIENT MESSAGE (OUTPATIENT)
Dept: FAMILY MEDICINE | Facility: CLINIC | Age: 29
End: 2024-07-29
Payer: COMMERCIAL

## 2024-07-30 ENCOUNTER — TELEPHONE (OUTPATIENT)
Dept: FAMILY MEDICINE | Facility: CLINIC | Age: 29
End: 2024-07-30
Payer: COMMERCIAL

## 2024-08-30 ENCOUNTER — OFFICE VISIT (OUTPATIENT)
Dept: FAMILY MEDICINE | Facility: CLINIC | Age: 29
End: 2024-08-30
Attending: FAMILY MEDICINE
Payer: COMMERCIAL

## 2024-08-30 ENCOUNTER — LAB VISIT (OUTPATIENT)
Dept: LAB | Facility: HOSPITAL | Age: 29
End: 2024-08-30
Attending: FAMILY MEDICINE
Payer: COMMERCIAL

## 2024-08-30 VITALS
WEIGHT: 229.5 LBS | BODY MASS INDEX: 34.78 KG/M2 | RESPIRATION RATE: 18 BRPM | OXYGEN SATURATION: 98 % | HEART RATE: 75 BPM | HEIGHT: 68 IN | TEMPERATURE: 98 F | SYSTOLIC BLOOD PRESSURE: 122 MMHG | DIASTOLIC BLOOD PRESSURE: 76 MMHG

## 2024-08-30 DIAGNOSIS — Z00.00 ANNUAL PHYSICAL EXAM: ICD-10-CM

## 2024-08-30 DIAGNOSIS — Z00.00 ANNUAL PHYSICAL EXAM: Primary | ICD-10-CM

## 2024-08-30 DIAGNOSIS — E66.9 OBESITY (BMI 30.0-34.9): ICD-10-CM

## 2024-08-30 DIAGNOSIS — F90.9 ATTENTION DEFICIT HYPERACTIVITY DISORDER (ADHD), UNSPECIFIED ADHD TYPE: ICD-10-CM

## 2024-08-30 LAB
ALBUMIN SERPL BCP-MCNC: 3.9 G/DL (ref 3.5–5.2)
ALP SERPL-CCNC: 61 U/L (ref 55–135)
ALT SERPL W/O P-5'-P-CCNC: 16 U/L (ref 10–44)
ANION GAP SERPL CALC-SCNC: 9 MMOL/L (ref 8–16)
AST SERPL-CCNC: 19 U/L (ref 10–40)
BASOPHILS # BLD AUTO: 0.05 K/UL (ref 0–0.2)
BASOPHILS NFR BLD: 0.9 % (ref 0–1.9)
BILIRUB SERPL-MCNC: 0.6 MG/DL (ref 0.1–1)
BUN SERPL-MCNC: 8 MG/DL (ref 6–20)
CALCIUM SERPL-MCNC: 9.9 MG/DL (ref 8.7–10.5)
CHLORIDE SERPL-SCNC: 105 MMOL/L (ref 95–110)
CHOLEST SERPL-MCNC: 166 MG/DL (ref 120–199)
CHOLEST/HDLC SERPL: 2.9 {RATIO} (ref 2–5)
CO2 SERPL-SCNC: 23 MMOL/L (ref 23–29)
CREAT SERPL-MCNC: 0.8 MG/DL (ref 0.5–1.4)
DIFFERENTIAL METHOD BLD: NORMAL
EOSINOPHIL # BLD AUTO: 0.1 K/UL (ref 0–0.5)
EOSINOPHIL NFR BLD: 1.7 % (ref 0–8)
ERYTHROCYTE [DISTWIDTH] IN BLOOD BY AUTOMATED COUNT: 13.6 % (ref 11.5–14.5)
EST. GFR  (NO RACE VARIABLE): >60 ML/MIN/1.73 M^2
GLUCOSE SERPL-MCNC: 73 MG/DL (ref 70–110)
HCT VFR BLD AUTO: 38.2 % (ref 37–48.5)
HDLC SERPL-MCNC: 57 MG/DL (ref 40–75)
HDLC SERPL: 34.3 % (ref 20–50)
HGB BLD-MCNC: 12.3 G/DL (ref 12–16)
IMM GRANULOCYTES # BLD AUTO: 0.01 K/UL (ref 0–0.04)
IMM GRANULOCYTES NFR BLD AUTO: 0.2 % (ref 0–0.5)
LDLC SERPL CALC-MCNC: 98 MG/DL (ref 63–159)
LYMPHOCYTES # BLD AUTO: 1.9 K/UL (ref 1–4.8)
LYMPHOCYTES NFR BLD: 33 % (ref 18–48)
MCH RBC QN AUTO: 29.1 PG (ref 27–31)
MCHC RBC AUTO-ENTMCNC: 32.2 G/DL (ref 32–36)
MCV RBC AUTO: 91 FL (ref 82–98)
MONOCYTES # BLD AUTO: 0.7 K/UL (ref 0.3–1)
MONOCYTES NFR BLD: 12.5 % (ref 4–15)
NEUTROPHILS # BLD AUTO: 3 K/UL (ref 1.8–7.7)
NEUTROPHILS NFR BLD: 51.7 % (ref 38–73)
NONHDLC SERPL-MCNC: 109 MG/DL
NRBC BLD-RTO: 0 /100 WBC
PLATELET # BLD AUTO: 262 K/UL (ref 150–450)
PMV BLD AUTO: 11.2 FL (ref 9.2–12.9)
POTASSIUM SERPL-SCNC: 4.7 MMOL/L (ref 3.5–5.1)
PROT SERPL-MCNC: 7.3 G/DL (ref 6–8.4)
RBC # BLD AUTO: 4.22 M/UL (ref 4–5.4)
SODIUM SERPL-SCNC: 137 MMOL/L (ref 136–145)
TRIGL SERPL-MCNC: 55 MG/DL (ref 30–150)
TSH SERPL DL<=0.005 MIU/L-ACNC: 0.63 UIU/ML (ref 0.4–4)
WBC # BLD AUTO: 5.76 K/UL (ref 3.9–12.7)

## 2024-08-30 PROCEDURE — 99999 PR PBB SHADOW E&M-EST. PATIENT-LVL IV: CPT | Mod: PBBFAC,,, | Performed by: FAMILY MEDICINE

## 2024-08-30 PROCEDURE — 3008F BODY MASS INDEX DOCD: CPT | Mod: CPTII,S$GLB,, | Performed by: FAMILY MEDICINE

## 2024-08-30 PROCEDURE — 36415 COLL VENOUS BLD VENIPUNCTURE: CPT | Mod: PO | Performed by: FAMILY MEDICINE

## 2024-08-30 PROCEDURE — 1159F MED LIST DOCD IN RCRD: CPT | Mod: CPTII,S$GLB,, | Performed by: FAMILY MEDICINE

## 2024-08-30 PROCEDURE — 99395 PREV VISIT EST AGE 18-39: CPT | Mod: S$GLB,,, | Performed by: FAMILY MEDICINE

## 2024-08-30 PROCEDURE — 3074F SYST BP LT 130 MM HG: CPT | Mod: CPTII,S$GLB,, | Performed by: FAMILY MEDICINE

## 2024-08-30 PROCEDURE — 80053 COMPREHEN METABOLIC PANEL: CPT | Performed by: FAMILY MEDICINE

## 2024-08-30 PROCEDURE — 80061 LIPID PANEL: CPT | Performed by: FAMILY MEDICINE

## 2024-08-30 PROCEDURE — 84443 ASSAY THYROID STIM HORMONE: CPT | Performed by: FAMILY MEDICINE

## 2024-08-30 PROCEDURE — 3078F DIAST BP <80 MM HG: CPT | Mod: CPTII,S$GLB,, | Performed by: FAMILY MEDICINE

## 2024-08-30 PROCEDURE — 1160F RVW MEDS BY RX/DR IN RCRD: CPT | Mod: CPTII,S$GLB,, | Performed by: FAMILY MEDICINE

## 2024-08-30 PROCEDURE — 85025 COMPLETE CBC W/AUTO DIFF WBC: CPT | Performed by: FAMILY MEDICINE

## 2024-08-30 NOTE — PROGRESS NOTES
HISTORY OF PRESENT ILLNESS:  Miss Burger comes in today for annual wellness examination.  She is fasting and has not taken medications today.  She reports no acute problems today.     END OF LIFE DECISION: She does not have a living will and desires life support.    Current Outpatient Medications   Medication Sig    ADZENYS XR-ODT 9.4 mg TbLB Take 1 tablet by mouth every morning.      SCREENINGS:       Cholesterol: August 14, 2023.      FFS/Colonoscopy: Never.     Mammogram:  Never.     GYN Exam: August 14, 2023 - okay. August 12, 2022 pap smear, HPV  - okay. Pap smear due 2025.      Dexa Scan:  N./A.     Eye Exam: 2020 per patient.      PPD: Never.     Immunizations: Tdap - December 17, 2009.Td: September 26, 2020.                              Gardasil - May 18, 2016, January 23, 2018.  September 26, 2020.                                Menactra - December 17, 2009, July 18, 2016.                              Zostavax - N./A.                              Pneumovax - Never.                              Seasonal Flu - September 26, 2020.                                      Covid-19 (Pfizer) vaccine series - June 21, 2021, January 14, 2022.                   ROS:  GENERAL: Denies fever, chills, fatigue or unusual weight change. Appetite normal. Monitors diet. Exercises 5 to 6 times per week, 30 60 minutes each time. Weight at 105.6 kg (232 lb 12.9 oz) at August 14, 2023 visit.  SKIN: Denies rashes, itching, changes in mole, color or texture of skin or easy bruising.  HEAD:  Denies headaches or recent head trauma.  EYES: Denies change in vision, pain, diplopia, redness or discharge.  EARS: Denies ear pain, discharge, vertigo or decreased hearing.  NOSE: Denies loss of smell, epistaxis or rhinitis.  MOUTH & THROAT: Denies hoarseness or change in voice. Denies excessive gum bleeding or mouth sores.  Denies sore throat.  NODES: Denies swollen glands.  CHEST: Denies LOPEZ, wheezing, cough, or sputum  "production.  CARDIOVASCULAR: Denies chest pain, PND, orthopnea or reduced exercise tolerance.  Denies palpitations.  ABDOMEN: Denies diarrhea, constipation, nausea, vomiting, abdominal pain, or blood in stool.  URINARY: Denies flank pain, dysuria or hematuria.  GENITOURINARY: Denies flank pain, dysuria, frequency or hematuria. Reports monthly menses. Does not perform monthly breast self examinations.  ENDOCRINE: Denies thyroid, cholesterol or diabetes problems.  HEME/LYMPH: Denies bleeding problems.  PERIPHERAL VASCULAR:Denies claudication or cyanosis.  MUSCULOSKELETAL: Denies joint stiffness, pain or swelling. Denies edema.  NEUROLOGIC: Denies history of seizures, tremors, paralysis, alteration of gait or coordination.  PSYCHIATRIC: Denies mood swings, depression, anxiety, homicidal or suicidal thoughts. Denies sleep problems. Follows with Dr. Mariah Tristan, psychiatrist with [facility], every 3 months for treatment of ADHD with last visit on June 20, 2024.      PE:   VS: /76 (BP Location: Right arm, Patient Position: Sitting)   Pulse 75   Temp 98 °F (36.7 °C) (Tympanic)   Resp 18   Ht 5' 8" (1.727 m)   Wt 104.1 kg (229 lb 8 oz)   LMP 08/13/2024 (Approximate) Comment: Monthly  SpO2 98%   BMI 34.90 kg/m²   APPEARANCE:  Well nourished, well developed female, obese and pleasant, alert and oriented in no acute distress.    HEAD: Nontender. Full range of motion.  EYES: PERRL, conjunctiva pink, lids no edema.  EARS: External canal patent, no swelling or redness. TM's shiny and clear.  NOSE: Mucosa and turbinates pink, not swollen. No discharge. Nontender sinuses.  THROAT: No pharyngeal erythema or exudate. No stridor.   NECK: Supple, no mass, thyroid not enlarged.  NODES: No cervical, axillary or inguinal lymph node enlargement.  CHEST: Normal respiratory effort. Lungs clear to auscultation.  CARDIOVASCULAR: Normal S1, S2. No rubs, murmurs or gallops. PMI not displaced. No carotid bruit. Pedal " pulses palpable bilaterally. No edema.  ABDOMEN: Bowel sounds present. Not distended. Soft. No tenderness, masses or organomegaly.  BREAST EXAM: Symmetrical, no external lesions, no discharge, no masses palpated.        PELVIC EXAM: No external lesions noted, no discharge, cervix appears normal, bimanual exam showed no uterine abnormalities and no adnexal   masses. Urethra and bladder intact.  RECTAL EXAM: Not examined.  MUSCULOSKELETAL: No joint deformities or stiffness. She is ambulatory without problems.  SKIN: No rashes or suspicious lesions, normal color and turgor.  NEUROLOGIC:   Cranial Nerves: II-XII grossly intact.   DTR's: Knees, Ankles 2+ and equal bilaterally. Gait & Posture: Normal gait and fine motion.  PSYCHIATRIC: Patient alert, oriented x 3. Mood/Affect normal without acute anxiety or depression noted. Judgment/insight good as she makes appropriate decisions during today's examination.      ASSESSMENT:    ICD-10-CM ICD-9-CM    1. Annual physical exam  Z00.00 V70.0 Lipid Panel      Comprehensive Metabolic Panel      CBC Auto Differential      TSH      2. Attention deficit hyperactivity disorder (ADHD), unspecified ADHD type  F90.9 314.01       3. Obesity (BMI 30.0-34.9)  E66.9 278.00         PLAN:  1. Age-appropriate counseling-appropriate low-sodium, low-cholesterol, low carbohydrate diet and exercise daily, monthly breast self exam, safe sex practices, annual wellness examination.   2. Patient advised to call for results.  3. Continue current medications.  4. Keep follow up with specialists.  5. Flu shot this fall.  6. Follow up in about 52 weeks (around 8/29/2025) for physical.

## 2024-09-05 PROBLEM — E66.01 SEVERE OBESITY (BMI 35.0-35.9 WITH COMORBIDITY): Status: RESOLVED | Noted: 2022-08-12 | Resolved: 2024-09-05
